# Patient Record
Sex: MALE | Race: WHITE | Employment: OTHER | URBAN - METROPOLITAN AREA
[De-identification: names, ages, dates, MRNs, and addresses within clinical notes are randomized per-mention and may not be internally consistent; named-entity substitution may affect disease eponyms.]

---

## 2017-01-24 RX ORDER — TAMSULOSIN HYDROCHLORIDE 0.4 MG/1
CAPSULE ORAL
Qty: 90 CAPSULE | Refills: 0 | OUTPATIENT
Start: 2017-01-24

## 2017-01-24 NOTE — TELEPHONE ENCOUNTER
Pt LOV 1/2/15 with Dr. James Longoria and has no upcoming reji. Refill denied pt needs to make reji.

## 2017-02-01 ENCOUNTER — TELEPHONE (OUTPATIENT)
Dept: FAMILY MEDICINE CLINIC | Facility: CLINIC | Age: 77
End: 2017-02-01

## 2017-02-01 RX ORDER — VALSARTAN 160 MG/1
160 TABLET ORAL DAILY
Qty: 30 TABLET | Refills: 0 | Status: SHIPPED | OUTPATIENT
Start: 2017-02-01 | End: 2017-02-10

## 2017-02-02 RX ORDER — VALSARTAN 160 MG/1
160 TABLET ORAL DAILY
Qty: 30 TABLET | Refills: 0 | Status: SHIPPED | OUTPATIENT
Start: 2017-02-02 | End: 2017-02-10 | Stop reason: ALTCHOICE

## 2017-02-09 ENCOUNTER — TELEPHONE (OUTPATIENT)
Dept: FAMILY MEDICINE CLINIC | Facility: CLINIC | Age: 77
End: 2017-02-09

## 2017-02-09 NOTE — TELEPHONE ENCOUNTER
Dr. Fabio Osman Hidden called again. Patient was to stop lisinopril due to continuous cough; medication was changed to Brand name Diovan at request of daughter on 2/2/17. They didn't  medication due to high cost of $250.      She is asking if Benicar

## 2017-02-09 NOTE — TELEPHONE ENCOUNTER
Patient's spouse called to speak with RN. Diovan was prescribed, but would like to discuss alternatives. Please call [phone].

## 2017-02-10 RX ORDER — VALSARTAN 160 MG/1
160 TABLET ORAL DAILY
Qty: 30 TABLET | Refills: 0 | Status: SHIPPED | OUTPATIENT
Start: 2017-02-10 | End: 2017-02-14

## 2017-02-10 NOTE — TELEPHONE ENCOUNTER
Sarah Meléndez informed of Dr Frannie Elliott recommendation and agrees. Asking that generic Diovan be sent to pharmacy then since she had requested name brand but it is too expensive. Replacement eRx sent.

## 2017-02-14 ENCOUNTER — TELEPHONE (OUTPATIENT)
Dept: FAMILY MEDICINE CLINIC | Facility: CLINIC | Age: 77
End: 2017-02-14

## 2017-02-14 RX ORDER — IRBESARTAN 150 MG/1
150 TABLET ORAL NIGHTLY
Qty: 90 TABLET | Refills: 0 | Status: SHIPPED | OUTPATIENT
Start: 2017-02-14 | End: 2017-04-05

## 2017-02-14 NOTE — TELEPHONE ENCOUNTER
Spouse states that pt is having headaches and nose bleeds this started after taking the Diovan medication. Transferred call to [facility].

## 2017-02-14 NOTE — TELEPHONE ENCOUNTER
Reason for Call/Chief Complaint: H/A and nosebleeds since beginning Diovan  Onset: Friday 2/10  Nursing Assessment/Associated Symptoms: Spoke to wife, states that lisinopril was causing cough.  Medication was changed to Diovan and since that time the pt has

## 2017-03-06 ENCOUNTER — TELEPHONE (OUTPATIENT)
Dept: FAMILY MEDICINE CLINIC | Facility: CLINIC | Age: 77
End: 2017-03-06

## 2017-03-06 RX ORDER — BUTALBITAL, ACETAMINOPHEN AND CAFFEINE 50; 325; 40 MG/1; MG/1; MG/1
1 TABLET ORAL EVERY 6 HOURS PRN
Qty: 60 TABLET | Refills: 0 | OUTPATIENT
Start: 2017-03-06 | End: 2017-10-16

## 2017-03-06 NOTE — TELEPHONE ENCOUNTER
Dr. Sarmad Downing for Dr. DALI Ibarra pt has a migraine. Daughter says that the symptoms are exactly the same as previous migraines. Denies numbness, tingling, weakness, blurred vision. She is asking for a refill for the fiorcet. Please advise.  We can call it in when appr

## 2017-03-06 NOTE — TELEPHONE ENCOUNTER
Called Butalbital-APAP-Caffeine -44 MG Oral Tab 3/6/17 script to [facility] pharmacist in [place] per Dr Beverley Horner written script.    Left detailed message for pt at his home telephone number [phone] to pickup his prescription at [facility]

## 2017-03-06 NOTE — TELEPHONE ENCOUNTER
Message noted: Chart reviewed and may refill medication as requested times one. Prescription signed. Please call into pharmacy as controlled substance.

## 2017-04-10 RX ORDER — IRBESARTAN 150 MG/1
TABLET ORAL
Qty: 30 TABLET | Refills: 0 | Status: SHIPPED | OUTPATIENT
Start: 2017-04-10 | End: 2017-06-08

## 2017-06-08 ENCOUNTER — OFFICE VISIT (OUTPATIENT)
Dept: FAMILY MEDICINE CLINIC | Facility: CLINIC | Age: 77
End: 2017-06-08

## 2017-06-08 VITALS
HEART RATE: 64 BPM | HEIGHT: 67 IN | BODY MASS INDEX: 27.65 KG/M2 | DIASTOLIC BLOOD PRESSURE: 70 MMHG | TEMPERATURE: 98 F | OXYGEN SATURATION: 95 % | SYSTOLIC BLOOD PRESSURE: 112 MMHG | RESPIRATION RATE: 16 BRPM | WEIGHT: 176.19 LBS

## 2017-06-08 DIAGNOSIS — I10 ESSENTIAL HYPERTENSION: ICD-10-CM

## 2017-06-08 DIAGNOSIS — J98.59 MEDIASTINAL MASS: ICD-10-CM

## 2017-06-08 DIAGNOSIS — Z00.00 ANNUAL PHYSICAL EXAM: Primary | ICD-10-CM

## 2017-06-08 DIAGNOSIS — N40.1 BENIGN PROSTATIC HYPERPLASIA WITH LOWER URINARY TRACT SYMPTOMS, UNSPECIFIED MORPHOLOGY: ICD-10-CM

## 2017-06-08 PROCEDURE — G0439 PPPS, SUBSEQ VISIT: HCPCS | Performed by: FAMILY MEDICINE

## 2017-06-08 RX ORDER — METOPROLOL SUCCINATE 50 MG/1
50 TABLET, EXTENDED RELEASE ORAL
Qty: 90 TABLET | Refills: 3 | Status: SHIPPED | OUTPATIENT
Start: 2017-06-08 | End: 2017-08-27

## 2017-06-08 RX ORDER — IRBESARTAN 150 MG/1
TABLET ORAL
Qty: 30 TABLET | Refills: 3 | Status: SHIPPED | OUTPATIENT
Start: 2017-06-08 | End: 2017-10-27

## 2017-06-08 RX ORDER — HYDROCHLOROTHIAZIDE 25 MG/1
25 TABLET ORAL
Qty: 90 TABLET | Refills: 3 | Status: SHIPPED | OUTPATIENT
Start: 2017-06-08 | End: 2019-02-11

## 2017-06-08 RX ORDER — TAMSULOSIN HYDROCHLORIDE 0.4 MG/1
CAPSULE ORAL
Qty: 90 CAPSULE | Refills: 3 | Status: SHIPPED | OUTPATIENT
Start: 2017-06-08 | End: 2018-07-30

## 2017-06-09 ENCOUNTER — APPOINTMENT (OUTPATIENT)
Dept: LAB | Age: 77
End: 2017-06-09
Attending: FAMILY MEDICINE
Payer: MEDICARE

## 2017-06-09 DIAGNOSIS — J98.59 MEDIASTINAL MASS: ICD-10-CM

## 2017-06-09 PROCEDURE — 85027 COMPLETE CBC AUTOMATED: CPT

## 2017-06-09 PROCEDURE — 80061 LIPID PANEL: CPT

## 2017-06-09 PROCEDURE — 80053 COMPREHEN METABOLIC PANEL: CPT

## 2017-06-09 PROCEDURE — 84443 ASSAY THYROID STIM HORMONE: CPT

## 2017-06-09 PROCEDURE — 36415 COLL VENOUS BLD VENIPUNCTURE: CPT

## 2017-06-09 PROCEDURE — 81003 URINALYSIS AUTO W/O SCOPE: CPT

## 2017-06-09 PROCEDURE — 84439 ASSAY OF FREE THYROXINE: CPT

## 2017-06-10 NOTE — PROGRESS NOTES
HPI:   Meryl Hedrick is a 77year old male who presents for a Medicare Subsequent Annual Wellness visit (Pt already had Initial Annual Wellness). Patient has a history of  hypertension.     Feels well without any adverse medication problems, and reports CBC  (most recent labs)     Lab Results  Component Value Date   WBC 8.0 06/09/2017   HGB 15.4 06/09/2017   .0 06/09/2017        ALLERGIES:   He has No Known Allergies.     CURRENT MEDICATIONS:     Outpatient Prescriptions Marked as Taking for the history  ALL/ASTHMA: denies hx of allergy or asthma    EXAM:   /70 mmHg  Pulse 64  Temp(Src) 98.2 °F (36.8 °C) (Oral)  Resp 16  Ht 67\"  Wt 176 lb 3.2 oz  BMI 27.59 kg/m2  SpO2 95%  Estimated body mass index is 27.59 kg/(m^2) as calculated from the f CHEST (CPT=71250);  Future  Benign prostatic hyperplasia with lower urinary tract symptoms, unspecified morphology  Continue Flomax  Essential hypertension  Refills given for Metoprolol and irbesartan  Low-salt diet  Exercise   to lose weight  Other orders help    Toileting: Able without help    Dressing: Able without help    Eating: Able without help    Driving: Able without help    Preparing your meals: Able without help    Managing money/bills: Able without help    Taking medications as prescribed: Able w Screening      HbgA1C   Annually No results found for: A1C    No flowsheet data found.     Fasting Blood Sugar (FSB)Annually   GLUCOSE (mg/dL)   Date Value   06/09/2017 96   06/20/2013 87   ----------  GLUCOSE (P) (mg/dL)   Date Value   02/03/2016 94   ---- SERUM (mg/dL)   Date Value   06/20/2013 0.83     CREATININE (mg/dL)   Date Value   06/09/2017 1.14     CREATININE (P) (mg/dL)   Date Value   02/03/2016 0.94    No flowsheet data found.     Drug Serum Conc  Annually No results found for: DIGOXIN, DIG, VALP N

## 2017-08-09 ENCOUNTER — OFFICE VISIT (OUTPATIENT)
Dept: FAMILY MEDICINE CLINIC | Facility: CLINIC | Age: 77
End: 2017-08-09

## 2017-08-09 VITALS
RESPIRATION RATE: 18 BRPM | BODY MASS INDEX: 26.97 KG/M2 | TEMPERATURE: 98 F | DIASTOLIC BLOOD PRESSURE: 80 MMHG | HEART RATE: 76 BPM | WEIGHT: 171.81 LBS | HEIGHT: 67 IN | SYSTOLIC BLOOD PRESSURE: 110 MMHG | OXYGEN SATURATION: 98 %

## 2017-08-09 DIAGNOSIS — R06.2 WHEEZING ON AUSCULTATION: ICD-10-CM

## 2017-08-09 DIAGNOSIS — J44.1 COPD WITH ACUTE EXACERBATION (HCC): Primary | ICD-10-CM

## 2017-08-09 PROCEDURE — 99214 OFFICE O/P EST MOD 30 MIN: CPT | Performed by: FAMILY MEDICINE

## 2017-08-09 PROCEDURE — 94640 AIRWAY INHALATION TREATMENT: CPT | Performed by: FAMILY MEDICINE

## 2017-08-09 RX ORDER — PREDNISONE 20 MG/1
20 TABLET ORAL DAILY
Qty: 5 TABLET | Refills: 0 | Status: SHIPPED | OUTPATIENT
Start: 2017-08-09 | End: 2017-08-14

## 2017-08-09 RX ORDER — FLUTICASONE PROPIONATE AND SALMETEROL 250; 50 UG/1; UG/1
1 POWDER RESPIRATORY (INHALATION) EVERY 12 HOURS SCHEDULED
Qty: 60 EACH | Refills: 6 | Status: SHIPPED | OUTPATIENT
Start: 2017-08-09 | End: 2019-02-11

## 2017-08-09 RX ORDER — IPRATROPIUM BROMIDE AND ALBUTEROL SULFATE 2.5; .5 MG/3ML; MG/3ML
3 SOLUTION RESPIRATORY (INHALATION) ONCE
Status: COMPLETED | OUTPATIENT
Start: 2017-08-09 | End: 2017-08-09

## 2017-08-09 RX ORDER — ALBUTEROL SULFATE 90 UG/1
2 AEROSOL, METERED RESPIRATORY (INHALATION) EVERY 6 HOURS PRN
Qty: 1 INHALER | Refills: 0 | Status: SHIPPED | OUTPATIENT
Start: 2017-08-09 | End: 2019-02-11

## 2017-08-09 RX ORDER — AMOXICILLIN 875 MG/1
875 TABLET, COATED ORAL 2 TIMES DAILY
Qty: 20 TABLET | Refills: 0 | Status: SHIPPED | OUTPATIENT
Start: 2017-08-09 | End: 2017-08-19

## 2017-08-09 RX ADMIN — IPRATROPIUM BROMIDE AND ALBUTEROL SULFATE 3 ML: 2.5; .5 SOLUTION RESPIRATORY (INHALATION) at 12:42:00

## 2017-08-10 NOTE — PROGRESS NOTES
/80   Pulse 76   Temp 98.1 °F (36.7 °C) (Oral)   Resp 18   Ht 67\"   Wt 171 lb 12.8 oz   SpO2 98%   BMI 26.91 kg/m²               Patient presents with:  Cough  Stuffy Nose       HPI;    Jesi Cr is a 69year old male.   Patient has history of every 6 (six) hours as needed for Pain. Disp: 60 tablet Rfl: 0   aspirin (SB LOW DOSE ASA EC) 81 MG Oral Tab EC Take 1 tablet (81 mg total) by mouth once daily.  Disp: 90 tablet Rfl: 3      Past Medical History:   Diagnosis Date   • HEADACHES    • HYPERTENS pharmacy  He will call me in 1 week if he is not feeling better      ipratropium-albuterol (DUONEB) nebulizer solution 3 mL; Take 3 mL by nebulization once. -     amoxicillin 875 MG Oral Tab; Take 1 tablet (875 mg total) by mouth 2 (two) times daily.   -

## 2017-08-29 RX ORDER — METOPROLOL SUCCINATE 50 MG/1
TABLET, EXTENDED RELEASE ORAL
Qty: 30 TABLET | Refills: 0 | Status: SHIPPED | OUTPATIENT
Start: 2017-08-29 | End: 2019-02-25

## 2017-10-03 ENCOUNTER — TELEPHONE (OUTPATIENT)
Dept: OTHER | Age: 77
End: 2017-10-03

## 2017-10-03 NOTE — TELEPHONE ENCOUNTER
----- Message from Terrance Meade RN sent at 10/3/2017  7:13 AM CDT -----      ----- Message -----  From: Sushil Son MD  Sent: 10/2/2017   6:08 PM  To: Em Harry Hoff Lpdarin/Hans    Call patient.  Needs to repeat CT scan chest as had abnormal ct scan more then a

## 2017-10-11 NOTE — TELEPHONE ENCOUNTER
Pt gave verbal permission to speak with his daughter Jeff Patricia over the phone. Reviewed Dr Fabio Rios orders 10/2/17 with her and verbalized understanding. 6/8/17 ct chest printed and mailed to home address on file.  Also informed pt and Rita to complete PHI f

## 2017-10-16 RX ORDER — BUTALBITAL, ACETAMINOPHEN AND CAFFEINE 50; 325; 40 MG/1; MG/1; MG/1
TABLET ORAL
Qty: 60 TABLET | Refills: 0 | Status: SHIPPED | OUTPATIENT
Start: 2017-10-16 | End: 2019-02-11

## 2017-10-16 NOTE — TELEPHONE ENCOUNTER
Pending Prescriptions Disp Refills    BUTALBITAL-APAP-CAFFEINE -40 MG Oral Tab [Pharmacy Med Name: BUTALBITAL/ACETAMINOPHEN/CAFF TABS] 60 tablet 0     Sig: TAKE 1 TABLET BY MOUTH EVERY 6 HOURS AS NEEDED FOR HEADACHE       Lov8/9/2017, No future reji

## 2017-10-27 DIAGNOSIS — I10 ESSENTIAL HYPERTENSION: Primary | ICD-10-CM

## 2017-10-30 RX ORDER — IRBESARTAN 150 MG/1
TABLET ORAL
Qty: 30 TABLET | Refills: 2 | Status: SHIPPED | OUTPATIENT
Start: 2017-10-30 | End: 2018-01-24

## 2017-10-30 NOTE — TELEPHONE ENCOUNTER
Refill as per protocol. LOV 8/9/2017    No future appointments.        Signed Prescriptions Disp Refills    IRBESARTAN 150 MG Oral Tab 30 tablet 2      Sig: TAKE 1 TABLET(150 MG) BY MOUTH EVERY NIGHT        Authorizing Provider: Miracle Quiroga

## 2018-01-01 NOTE — TELEPHONE ENCOUNTER
Called pt, wife answered the phone and said pt will be going out of the country tomorrow for a month and half. Pt's wife states she will call back and book an appt when pt comes back.
Hypertensive Medications Protocol Failed4/5 11:46 AM   CMP or BMP in past 12 months    Appointment in past 6 or next 3 months    Serum Creatinine < 2.0
Last refill.  May get only 1 month  Has to make an apt for furher
Noted. No further action needed.     -marilyn
rx sent. ISHA, please call pt and schedule OV. Thanks.
PMD

## 2018-01-24 DIAGNOSIS — I10 ESSENTIAL HYPERTENSION: ICD-10-CM

## 2018-01-24 RX ORDER — IRBESARTAN 150 MG/1
TABLET ORAL
Qty: 30 TABLET | Refills: 2 | Status: SHIPPED | OUTPATIENT
Start: 2018-01-24 | End: 2019-02-11

## 2018-01-24 NOTE — TELEPHONE ENCOUNTER
Signed Prescriptions Disp Refills    IRBESARTAN 150 MG Oral Tab 30 tablet 2      Sig: TAKE 1 TABLET(150 MG) BY MOUTH EVERY NIGHT        Authorizing Provider: Glorine Boeck        Ordering User: Manuel Purcell         Lov: 8/9/2017

## 2018-01-26 ENCOUNTER — TELEPHONE (OUTPATIENT)
Dept: GASTROENTEROLOGY | Facility: CLINIC | Age: 78
End: 2018-01-26

## 2018-01-26 NOTE — TELEPHONE ENCOUNTER
----- Message from Jocelyn Mendes, [address] [place] [address] sent at 1/23/2018 11:36 AM CST -----      ----- Message -----  From: Enma Loyd MD  Sent: 1/23/2018   9:48 AM  To: Cami Hoff Lpn/Hans    Please send certified letter that he needs repeat ct scan chest due abnormal

## 2018-03-09 ENCOUNTER — LAB ENCOUNTER (OUTPATIENT)
Dept: LAB | Age: 78
End: 2018-03-09
Attending: FAMILY MEDICINE
Payer: MEDICARE

## 2018-03-09 DIAGNOSIS — I10 ESSENTIAL HYPERTENSION: ICD-10-CM

## 2018-03-09 DIAGNOSIS — D29.1 BENIGN NEOPLASM OF PROSTATE: Primary | ICD-10-CM

## 2018-03-09 DIAGNOSIS — J30.9 ALLERGIC RHINITIS DUE TO ALLERGEN: ICD-10-CM

## 2018-03-09 DIAGNOSIS — Z00.00 ROUTINE GENERAL MEDICAL EXAMINATION AT A HEALTH CARE FACILITY: ICD-10-CM

## 2018-03-09 DIAGNOSIS — I10 ESSENTIAL HYPERTENSION, MALIGNANT: ICD-10-CM

## 2018-03-09 LAB
25-HYDROXYVITAMIN D (TOTAL): 20.5 NG/ML (ref 30–100)
ALBUMIN SERPL-MCNC: 3.5 G/DL (ref 3.5–4.8)
ALP LIVER SERPL-CCNC: 76 U/L (ref 45–117)
ALT SERPL-CCNC: 22 U/L (ref 17–63)
AST SERPL-CCNC: 17 U/L (ref 15–41)
BASOPHILS # BLD AUTO: 0.11 X10(3) UL (ref 0–0.1)
BASOPHILS NFR BLD AUTO: 1.5 %
BILIRUB SERPL-MCNC: 0.8 MG/DL (ref 0.1–2)
BILIRUB UR QL STRIP.AUTO: NEGATIVE
BUN BLD-MCNC: 24 MG/DL (ref 8–20)
CALCIUM BLD-MCNC: 9.3 MG/DL (ref 8.3–10.3)
CHLORIDE: 106 MMOL/L (ref 101–111)
CHOLEST SMN-MCNC: 217 MG/DL (ref ?–200)
CLARITY UR REFRACT.AUTO: CLEAR
CO2: 29 MMOL/L (ref 22–32)
COLOR UR AUTO: YELLOW
CREAT BLD-MCNC: 0.85 MG/DL (ref 0.7–1.3)
EOSINOPHIL # BLD AUTO: 0.62 X10(3) UL (ref 0–0.3)
EOSINOPHIL NFR BLD AUTO: 8.3 %
ERYTHROCYTE [DISTWIDTH] IN BLOOD BY AUTOMATED COUNT: 13 % (ref 11.5–16)
GLUCOSE BLD-MCNC: 93 MG/DL (ref 70–99)
GLUCOSE UR STRIP.AUTO-MCNC: NEGATIVE MG/DL
HAV IGM SER QL: NONREACTIVE
HBV CORE IGM SER QL: NONREACTIVE
HBV SURFACE AG SERPL QL IA: NONREACTIVE
HCT VFR BLD AUTO: 43.7 % (ref 37–53)
HDLC SERPL-MCNC: 43 MG/DL (ref 45–?)
HDLC SERPL: 5.05 {RATIO} (ref ?–4.97)
HEPATITIS C VIRUS AB INTERPRETATION: NONREACTIVE
HGB BLD-MCNC: 15 G/DL (ref 13–17)
IMMATURE GRANULOCYTE COUNT: 0.01 X10(3) UL (ref 0–1)
IMMATURE GRANULOCYTE RATIO %: 0.1 %
KETONES UR STRIP.AUTO-MCNC: NEGATIVE MG/DL
LDLC SERPL CALC-MCNC: 145 MG/DL (ref ?–130)
LEUKOCYTE ESTERASE UR QL STRIP.AUTO: NEGATIVE
LYMPHOCYTES # BLD AUTO: 2.24 X10(3) UL (ref 0.9–4)
LYMPHOCYTES NFR BLD AUTO: 30 %
M PROTEIN MFR SERPL ELPH: 7.1 G/DL (ref 6.1–8.3)
MCH RBC QN AUTO: 29.6 PG (ref 27–33.2)
MCHC RBC AUTO-ENTMCNC: 34.3 G/DL (ref 31–37)
MCV RBC AUTO: 86.4 FL (ref 80–99)
MONOCYTES # BLD AUTO: 0.83 X10(3) UL (ref 0.1–1)
MONOCYTES NFR BLD AUTO: 11.1 %
NEUTROPHIL ABS PRELIM: 3.65 X10 (3) UL (ref 1.3–6.7)
NEUTROPHILS # BLD AUTO: 3.65 X10(3) UL (ref 1.3–6.7)
NEUTROPHILS NFR BLD AUTO: 49 %
NITRITE UR QL STRIP.AUTO: NEGATIVE
NONHDLC SERPL-MCNC: 174 MG/DL (ref ?–130)
PH UR STRIP.AUTO: 6 [PH] (ref 4.5–8)
PLATELET # BLD AUTO: 259 10(3)UL (ref 150–450)
POTASSIUM SERPL-SCNC: 3.7 MMOL/L (ref 3.6–5.1)
PROT UR STRIP.AUTO-MCNC: NEGATIVE MG/DL
PSA SERPL-MCNC: 1.63 NG/ML (ref 0.01–4)
PTH-INTACT SERPL-MCNC: 73.2 PG/ML (ref 11.1–79.5)
RBC # BLD AUTO: 5.06 X10(6)UL (ref 3.8–5.8)
RBC UR QL AUTO: NEGATIVE
RED CELL DISTRIBUTION WIDTH-SD: 40.1 FL (ref 35.1–46.3)
SODIUM SERPL-SCNC: 142 MMOL/L (ref 136–144)
SP GR UR STRIP.AUTO: 1.02 (ref 1–1.03)
TRIGL SERPL-MCNC: 146 MG/DL (ref ?–150)
TSI SER-ACNC: 0.1 MIU/ML (ref 0.35–5.5)
UROBILINOGEN UR STRIP.AUTO-MCNC: <2 MG/DL
VLDLC SERPL CALC-MCNC: 29 MG/DL (ref 5–40)
WBC # BLD AUTO: 7.5 X10(3) UL (ref 4–13)

## 2018-03-09 PROCEDURE — 80053 COMPREHEN METABOLIC PANEL: CPT

## 2018-03-09 PROCEDURE — 83970 ASSAY OF PARATHORMONE: CPT

## 2018-03-09 PROCEDURE — 84153 ASSAY OF PSA TOTAL: CPT

## 2018-03-09 PROCEDURE — 80061 LIPID PANEL: CPT

## 2018-03-09 PROCEDURE — 36415 COLL VENOUS BLD VENIPUNCTURE: CPT

## 2018-03-09 PROCEDURE — 81003 URINALYSIS AUTO W/O SCOPE: CPT

## 2018-03-09 PROCEDURE — 82306 VITAMIN D 25 HYDROXY: CPT

## 2018-03-09 PROCEDURE — 80074 ACUTE HEPATITIS PANEL: CPT

## 2018-03-09 PROCEDURE — 85025 COMPLETE CBC W/AUTO DIFF WBC: CPT

## 2018-03-09 PROCEDURE — 84443 ASSAY THYROID STIM HORMONE: CPT

## 2018-05-04 DIAGNOSIS — I10 ESSENTIAL HYPERTENSION: ICD-10-CM

## 2018-05-04 RX ORDER — IRBESARTAN 150 MG/1
TABLET ORAL
Qty: 30 TABLET | Refills: 0 | OUTPATIENT
Start: 2018-05-04

## 2018-05-04 NOTE — TELEPHONE ENCOUNTER
Spoke with daughter, Meli Aguero. Pt has a new PCP. He is no longer under the care of Dr Tj Haley.      Denied refill request for Irbesartan

## 2018-07-20 RX ORDER — HYDROCHLOROTHIAZIDE 25 MG/1
TABLET ORAL
Qty: 90 TABLET | Refills: 0 | OUTPATIENT
Start: 2018-07-20

## 2018-07-20 NOTE — TELEPHONE ENCOUNTER
LOV 8/9/17    LAST LAB 3/9/18    LAST RX 6/8/17  #90  3 refills    Next OV  No future appointments.     PROTOCOL  HYDROCHLOROTHIAZIDE 25MG TABLETS  Will file in chart as: HYDROCHLOROTHIAZIDE 25 MG Oral Tab  TAKE 1 TABLET(25 MG) BY MOUTH EVERY DAY       Disp

## 2018-08-01 RX ORDER — METOPROLOL SUCCINATE 50 MG/1
TABLET, EXTENDED RELEASE ORAL
Qty: 90 TABLET | Refills: 0 | Status: SHIPPED | OUTPATIENT
Start: 2018-08-01 | End: 2019-02-11

## 2018-08-01 RX ORDER — TAMSULOSIN HYDROCHLORIDE 0.4 MG/1
CAPSULE ORAL
Qty: 90 CAPSULE | Refills: 0 | Status: SHIPPED | OUTPATIENT
Start: 2018-08-01 | End: 2019-02-11

## 2018-08-01 NOTE — TELEPHONE ENCOUNTER
LOV 8/9/17- COPD    LAST LAB 3/9/18- Ordered by Dr. Krysta Urbina, Family Medicine, [facility] 6/8/17  #90  3 refills    Next OV No future appointments.     PROTOCOL  METOPROLOL ER SUCCINATE 50MG TABS  The source prescription was discontinued on

## 2019-02-11 ENCOUNTER — OFFICE VISIT (OUTPATIENT)
Dept: FAMILY MEDICINE CLINIC | Facility: CLINIC | Age: 79
End: 2019-02-11
Payer: MEDICARE

## 2019-02-11 VITALS
HEART RATE: 68 BPM | TEMPERATURE: 99 F | RESPIRATION RATE: 16 BRPM | DIASTOLIC BLOOD PRESSURE: 74 MMHG | OXYGEN SATURATION: 95 % | WEIGHT: 174.38 LBS | SYSTOLIC BLOOD PRESSURE: 130 MMHG | BODY MASS INDEX: 26.74 KG/M2 | HEIGHT: 67.72 IN

## 2019-02-11 DIAGNOSIS — J44.1 COPD WITH ACUTE EXACERBATION (HCC): ICD-10-CM

## 2019-02-11 DIAGNOSIS — Z13.6 SCREENING FOR CARDIOVASCULAR CONDITION: ICD-10-CM

## 2019-02-11 DIAGNOSIS — Z00.00 ENCOUNTER FOR ANNUAL HEALTH EXAMINATION: Primary | ICD-10-CM

## 2019-02-11 DIAGNOSIS — I10 ESSENTIAL HYPERTENSION: ICD-10-CM

## 2019-02-11 PROCEDURE — G0439 PPPS, SUBSEQ VISIT: HCPCS | Performed by: FAMILY MEDICINE

## 2019-02-11 PROCEDURE — 99497 ADVNCD CARE PLAN 30 MIN: CPT | Performed by: FAMILY MEDICINE

## 2019-02-11 RX ORDER — HYDROCHLOROTHIAZIDE 25 MG/1
25 TABLET ORAL
Qty: 90 TABLET | Refills: 3 | Status: SHIPPED | OUTPATIENT
Start: 2019-02-11 | End: 2020-02-07

## 2019-02-11 RX ORDER — TAMSULOSIN HYDROCHLORIDE 0.4 MG/1
CAPSULE ORAL
Qty: 90 CAPSULE | Refills: 3 | Status: SHIPPED | OUTPATIENT
Start: 2019-02-11 | End: 2020-02-07

## 2019-02-11 RX ORDER — METOPROLOL SUCCINATE 50 MG/1
TABLET, EXTENDED RELEASE ORAL
Qty: 90 TABLET | Refills: 1 | Status: SHIPPED | OUTPATIENT
Start: 2019-02-11 | End: 2019-08-08

## 2019-02-11 RX ORDER — IRBESARTAN 150 MG/1
TABLET ORAL
Qty: 90 TABLET | Refills: 2 | Status: SHIPPED | OUTPATIENT
Start: 2019-02-11 | End: 2019-11-06

## 2019-02-11 RX ORDER — ASPIRIN 81 MG/1
81 TABLET ORAL
Qty: 90 TABLET | Refills: 3 | Status: SHIPPED | OUTPATIENT
Start: 2019-02-11 | End: 2020-02-24

## 2019-02-11 RX ORDER — FLUTICASONE PROPIONATE AND SALMETEROL 250; 50 UG/1; UG/1
1 POWDER RESPIRATORY (INHALATION) EVERY 12 HOURS SCHEDULED
Qty: 60 EACH | Refills: 6 | Status: SHIPPED | OUTPATIENT
Start: 2019-02-11 | End: 2019-02-25

## 2019-02-11 RX ORDER — ALBUTEROL SULFATE 90 UG/1
2 AEROSOL, METERED RESPIRATORY (INHALATION) EVERY 6 HOURS PRN
Qty: 1 INHALER | Refills: 0 | Status: SHIPPED | OUTPATIENT
Start: 2019-02-11 | End: 2019-04-03

## 2019-02-11 RX ORDER — BUTALBITAL, ACETAMINOPHEN AND CAFFEINE 50; 325; 40 MG/1; MG/1; MG/1
TABLET ORAL
Qty: 60 TABLET | Refills: 0 | Status: SHIPPED | OUTPATIENT
Start: 2019-02-11 | End: 2019-05-31

## 2019-02-11 NOTE — PROGRESS NOTES
HPI:   Brian Garcia is a 69year old male who presents for a Medicare Subsequent Annual Wellness visit (Pt already had Initial Annual Wellness).            Fall/Risk Assessment   He has been screened for Falls and is low risk: Fall/Risk Scorin    C was screened for Alcohol abuse and had a score of 0 so is at low risk.      Patient Care Team: No care team member to display    Patient Active Problem List:     Migraine without aura, without mention of intractable migraine without mention of status migrai tablet (25 mg total) by mouth once daily. aspirin (SB LOW DOSE ASA EC) 81 MG Oral Tab EC Take 1 tablet (81 mg total) by mouth once daily.    METOPROLOL SUCCINATE ER 50 MG Oral Tablet 24 Hr TAKE 1 TABLET BY MOUTH EVERY DAY      MEDICAL INFORMATION:   He  h no discharge or no axillary lymphadenopathy   LUNGS: clear to auscultation  CARDIO: RRR without murmur  GI: good BS's, no masses, HSM or tenderness  : deferred  RECTAL: deferred  MUSCULOSKELETAL: back is not tender, FROM of the back  EXTREMITIES: no cyan PLAN:  The patient indicates understanding of these issues and agrees to the plan. Reinforced healthy diet, lifestyle, and exercise. Recommended OTC medications- Vitamin D  Prescription medication ordered. Imaging studies ordered. Lab work ordered. FHx Glaucoma, AA>50, > 65 No flowsheet data found. Prostate Cancer Screening      PSA  Annually There are no preventive care reminders to display for this patient.   Update Health Maintenance if applicable     Immunizations (Update Immunization A

## 2019-02-11 NOTE — PATIENT INSTRUCTIONS
Caitlin Claudio's SCREENING SCHEDULE   Tests on this list are recommended by your physician but may not be covered, or covered at this frequency, by your insurer. Please check with your insurance carrier before scheduling to verify coverage.     JOHN Cancer Screening Covered up to Age 76     Colonoscopy Screen   Covered every 10 years- more often if abnormal There are no preventive care reminders to display for this patient.  Update Home[employer] if applicable    Flex Sigmoidoscopy Screen  Covered with a cut with metal- TD and TDaP Not covered by Medicare Part B) No orders found for this or any previous visit.  This may be covered with your prescription benefits, but Medicare does not cover unless Medically needed    Zoster (Not covered by Medicare P

## 2019-02-15 ENCOUNTER — LAB ENCOUNTER (OUTPATIENT)
Dept: LAB | Age: 79
End: 2019-02-15
Attending: FAMILY MEDICINE
Payer: MEDICARE

## 2019-02-15 ENCOUNTER — TELEPHONE (OUTPATIENT)
Dept: FAMILY MEDICINE CLINIC | Facility: CLINIC | Age: 79
End: 2019-02-15

## 2019-02-15 DIAGNOSIS — Z00.00 ROUTINE GENERAL MEDICAL EXAMINATION AT A HEALTH CARE FACILITY: Primary | ICD-10-CM

## 2019-02-15 DIAGNOSIS — R79.89 LOW TSH LEVEL: ICD-10-CM

## 2019-02-15 DIAGNOSIS — Z00.00 ENCOUNTER FOR ANNUAL HEALTH EXAMINATION: ICD-10-CM

## 2019-02-15 DIAGNOSIS — Z13.6 SCREENING FOR CARDIOVASCULAR CONDITION: ICD-10-CM

## 2019-02-15 DIAGNOSIS — Z00.00 ROUTINE GENERAL MEDICAL EXAMINATION AT A HEALTH CARE FACILITY: ICD-10-CM

## 2019-02-15 LAB
ALBUMIN SERPL-MCNC: 3.6 G/DL (ref 3.4–5)
ALBUMIN/GLOB SERPL: 1.1 {RATIO} (ref 1–2)
ALP LIVER SERPL-CCNC: 69 U/L (ref 45–117)
ALT SERPL-CCNC: 24 U/L (ref 16–61)
ANION GAP SERPL CALC-SCNC: 7 MMOL/L (ref 0–18)
AST SERPL-CCNC: 14 U/L (ref 15–37)
BASOPHILS # BLD AUTO: 0.11 X10(3) UL (ref 0–0.2)
BASOPHILS NFR BLD AUTO: 1.5 %
BILIRUB SERPL-MCNC: 0.7 MG/DL (ref 0.1–2)
BILIRUB UR QL STRIP.AUTO: NEGATIVE
BUN BLD-MCNC: 28 MG/DL (ref 7–18)
BUN/CREAT SERPL: 28.6 (ref 10–20)
CALCIUM BLD-MCNC: 8.7 MG/DL (ref 8.5–10.1)
CHLORIDE SERPL-SCNC: 102 MMOL/L (ref 98–107)
CHOLEST SMN-MCNC: 249 MG/DL (ref ?–200)
CLARITY UR REFRACT.AUTO: CLEAR
CO2 SERPL-SCNC: 28 MMOL/L (ref 21–32)
COLOR UR AUTO: YELLOW
COMPLEXED PSA SERPL-MCNC: 1.49 NG/ML (ref ?–4)
CREAT BLD-MCNC: 0.98 MG/DL (ref 0.7–1.3)
DEPRECATED RDW RBC AUTO: 39.9 FL (ref 35.1–46.3)
EOSINOPHIL # BLD AUTO: 0.53 X10(3) UL (ref 0–0.7)
EOSINOPHIL NFR BLD AUTO: 7.1 %
ERYTHROCYTE [DISTWIDTH] IN BLOOD BY AUTOMATED COUNT: 12.7 % (ref 11–15)
GLOBULIN PLAS-MCNC: 3.4 G/DL (ref 2.8–4.4)
GLUCOSE BLD-MCNC: 94 MG/DL (ref 70–99)
GLUCOSE UR STRIP.AUTO-MCNC: NEGATIVE MG/DL
HCT VFR BLD AUTO: 43.7 % (ref 39–53)
HDLC SERPL-MCNC: 49 MG/DL (ref 40–59)
HGB BLD-MCNC: 15.5 G/DL (ref 13–17.5)
IMM GRANULOCYTES # BLD AUTO: 0.02 X10(3) UL (ref 0–1)
IMM GRANULOCYTES NFR BLD: 0.3 %
KETONES UR STRIP.AUTO-MCNC: NEGATIVE MG/DL
LDLC SERPL CALC-MCNC: 175 MG/DL (ref ?–100)
LEUKOCYTE ESTERASE UR QL STRIP.AUTO: NEGATIVE
LYMPHOCYTES # BLD AUTO: 2.55 X10(3) UL (ref 1–4)
LYMPHOCYTES NFR BLD AUTO: 34 %
M PROTEIN MFR SERPL ELPH: 7 G/DL (ref 6.4–8.2)
MCH RBC QN AUTO: 30.7 PG (ref 26–34)
MCHC RBC AUTO-ENTMCNC: 35.5 G/DL (ref 31–37)
MCV RBC AUTO: 86.5 FL (ref 80–100)
MONOCYTES # BLD AUTO: 0.94 X10(3) UL (ref 0.1–1)
MONOCYTES NFR BLD AUTO: 12.6 %
NEUTROPHILS # BLD AUTO: 3.34 X10 (3) UL (ref 1.5–7.7)
NEUTROPHILS # BLD AUTO: 3.34 X10(3) UL (ref 1.5–7.7)
NEUTROPHILS NFR BLD AUTO: 44.5 %
NITRITE UR QL STRIP.AUTO: NEGATIVE
NONHDLC SERPL-MCNC: 200 MG/DL (ref ?–130)
OSMOLALITY SERPL CALC.SUM OF ELEC: 289 MOSM/KG (ref 275–295)
PH UR STRIP.AUTO: 6 [PH] (ref 4.5–8)
PLATELET # BLD AUTO: 234 10(3)UL (ref 150–450)
POTASSIUM SERPL-SCNC: 3.6 MMOL/L (ref 3.5–5.1)
PROT UR STRIP.AUTO-MCNC: NEGATIVE MG/DL
RBC # BLD AUTO: 5.05 X10(6)UL (ref 3.8–5.8)
RBC UR QL AUTO: NEGATIVE
SODIUM SERPL-SCNC: 137 MMOL/L (ref 136–145)
SP GR UR STRIP.AUTO: 1.02 (ref 1–1.03)
T3 SERPL-MCNC: 104 NG/DL (ref 60–181)
T4 FREE SERPL-MCNC: 1.2 NG/DL (ref 0.8–1.7)
T4 SERPL-MCNC: 11.6 UG/DL (ref 4.5–12.1)
TRIGL SERPL-MCNC: 125 MG/DL (ref 30–149)
TSI SER-ACNC: 0.15 MIU/ML (ref 0.36–3.74)
UROBILINOGEN UR STRIP.AUTO-MCNC: <2 MG/DL
VLDLC SERPL CALC-MCNC: 25 MG/DL (ref 0–30)
WBC # BLD AUTO: 7.5 X10(3) UL (ref 4–11)

## 2019-02-15 PROCEDURE — 84443 ASSAY THYROID STIM HORMONE: CPT

## 2019-02-15 PROCEDURE — 81003 URINALYSIS AUTO W/O SCOPE: CPT

## 2019-02-15 PROCEDURE — 85025 COMPLETE CBC W/AUTO DIFF WBC: CPT

## 2019-02-15 PROCEDURE — 84439 ASSAY OF FREE THYROXINE: CPT

## 2019-02-15 PROCEDURE — 80053 COMPREHEN METABOLIC PANEL: CPT

## 2019-02-15 PROCEDURE — 84480 ASSAY TRIIODOTHYRONINE (T3): CPT

## 2019-02-15 PROCEDURE — 80061 LIPID PANEL: CPT

## 2019-02-16 NOTE — PROGRESS NOTES
All the blood work is within acceptable range   except chronically low TSH, but with normal T3 and T4--ultrasound of the thyroid, will be ordered at the time of follow-up  Elevated cholesterol, LDL.   Patient needs to be on cholesterol-lowering medication

## 2019-02-18 NOTE — PROGRESS NOTES
Spoke to patient/daughter  informed of results and information given.     Future Appointments  2/25/2019  1:00 PM    Yuliana Kapoor MD           [facility]

## 2019-02-25 ENCOUNTER — OFFICE VISIT (OUTPATIENT)
Dept: FAMILY MEDICINE CLINIC | Facility: CLINIC | Age: 79
End: 2019-02-25
Payer: MEDICARE

## 2019-02-25 VITALS
DIASTOLIC BLOOD PRESSURE: 70 MMHG | SYSTOLIC BLOOD PRESSURE: 124 MMHG | HEART RATE: 81 BPM | OXYGEN SATURATION: 98 % | WEIGHT: 174.38 LBS | HEIGHT: 67.72 IN | TEMPERATURE: 97 F | RESPIRATION RATE: 16 BRPM | BODY MASS INDEX: 26.74 KG/M2

## 2019-02-25 DIAGNOSIS — J44.9 CHRONIC OBSTRUCTIVE PULMONARY DISEASE, UNSPECIFIED COPD TYPE (HCC): ICD-10-CM

## 2019-02-25 DIAGNOSIS — E78.2 MIXED HYPERLIPIDEMIA: ICD-10-CM

## 2019-02-25 DIAGNOSIS — R79.89 LOW TSH LEVEL: ICD-10-CM

## 2019-02-25 DIAGNOSIS — Z86.39 HX OF THYROID NODULE: Primary | ICD-10-CM

## 2019-02-25 PROCEDURE — 99214 OFFICE O/P EST MOD 30 MIN: CPT | Performed by: FAMILY MEDICINE

## 2019-02-25 RX ORDER — ROSUVASTATIN CALCIUM 5 MG/1
5 TABLET, COATED ORAL NIGHTLY
Qty: 30 TABLET | Refills: 0 | Status: SHIPPED | OUTPATIENT
Start: 2019-02-25 | End: 2019-11-14

## 2019-02-25 NOTE — PROGRESS NOTES
/70   Pulse 81   Temp 97.3 °F (36.3 °C) (Oral)   Resp 16   Ht 67.72\"   Wt 174 lb 6 oz   SpO2 98%   BMI 26.73 kg/m²               Patient presents with:  Lab Results: patient refused phneumovax vaccine        HPI;    Ta Ramsey is a 69year old lungs every 6 (six) hours as needed for Wheezing. Disp: 1 Inhaler Rfl: 0   hydrochlorothiazide 25 MG Oral Tab Take 1 tablet (25 mg total) by mouth once daily.  Disp: 90 tablet Rfl: 3   aspirin (SB LOW DOSE ASA EC) 81 MG Oral Tab EC Take 1 tablet (81 mg tota sending a prescription for Flovent 220  Patient will call me if the insurance does not cover this medication      Fluticasone Propionate HFA (FLOVENT HFA) 220 MCG/ACT Inhalation Aerosol; Inhale 1 puff into the lungs 2 (two) times daily.     Mixed hyperlipid

## 2019-02-27 ENCOUNTER — TELEPHONE (OUTPATIENT)
Dept: FAMILY MEDICINE CLINIC | Facility: CLINIC | Age: 79
End: 2019-02-27

## 2019-02-27 DIAGNOSIS — E78.00 HYPERCHOLESTEREMIA: Primary | ICD-10-CM

## 2019-02-27 NOTE — TELEPHONE ENCOUNTER
LOV 2/25/19 Recheck lipid profile in 2 months. Lab ordered. Pharmacy wanted 90 days advised that this is a new Rx and patient will need to f/u.

## 2019-04-03 ENCOUNTER — OFFICE VISIT (OUTPATIENT)
Dept: FAMILY MEDICINE CLINIC | Facility: CLINIC | Age: 79
End: 2019-04-03
Payer: MEDICARE

## 2019-04-03 VITALS
SYSTOLIC BLOOD PRESSURE: 122 MMHG | RESPIRATION RATE: 18 BRPM | BODY MASS INDEX: 25.91 KG/M2 | HEART RATE: 71 BPM | TEMPERATURE: 98 F | HEIGHT: 67.2 IN | WEIGHT: 167 LBS | OXYGEN SATURATION: 98 % | DIASTOLIC BLOOD PRESSURE: 78 MMHG

## 2019-04-03 DIAGNOSIS — J44.1 COPD WITH ACUTE EXACERBATION (HCC): ICD-10-CM

## 2019-04-03 PROCEDURE — 99213 OFFICE O/P EST LOW 20 MIN: CPT | Performed by: FAMILY MEDICINE

## 2019-04-03 RX ORDER — ALBUTEROL SULFATE 90 UG/1
2 AEROSOL, METERED RESPIRATORY (INHALATION) EVERY 6 HOURS PRN
Qty: 1 INHALER | Refills: 0 | Status: SHIPPED | OUTPATIENT
Start: 2019-04-03 | End: 2019-05-01 | Stop reason: ALTCHOICE

## 2019-04-03 RX ORDER — ALBUTEROL SULFATE 90 UG/1
AEROSOL, METERED RESPIRATORY (INHALATION)
Qty: 25.5 G | Refills: 0 | OUTPATIENT
Start: 2019-04-03

## 2019-04-03 RX ORDER — AZITHROMYCIN 250 MG/1
TABLET, FILM COATED ORAL
Qty: 6 TABLET | Refills: 0 | Status: SHIPPED | OUTPATIENT
Start: 2019-04-03 | End: 2019-05-01 | Stop reason: ALTCHOICE

## 2019-04-03 NOTE — TELEPHONE ENCOUNTER
Name from pharmacy: ALBUTEROL HFA INH (200 PUFFS) 8.5GM          Will file in chart as: ALBUTEROL SULFATE  (90 Base) MCG/ACT Inhalation Aero Soln    Sig: INHALE 2 PUFFS INTO THE LUNGS EVERY 6 HOURS AS NEEDED FOR WHEEZING    Disp:  25.5 g    Refills:

## 2019-04-03 NOTE — PROGRESS NOTES
/78   Pulse 71   Temp 98.2 °F (36.8 °C) (Oral)   Resp 18   Ht 67.2\"   Wt 167 lb   SpO2 98%   BMI 26.00 kg/m²               Patient presents with:  Cough       HPI;    Brittny Colbert is a 69year old male.   Speaks Jamaican   History of COPD comes in tablet (25 mg total) by mouth once daily. Disp: 90 tablet Rfl: 3   aspirin (SB LOW DOSE ASA EC) 81 MG Oral Tab EC Take 1 tablet (81 mg total) by mouth once daily.  Disp: 90 tablet Rfl: 3      Past Medical History:   Diagnosis Date   • HEADACHES    • HYPERTE HFA) 220 MCG/ACT Inhalation Aerosol; Inhale 1 puff into the lungs 2 (two) times daily. -     Albuterol Sulfate  (90 Base) MCG/ACT Inhalation Aero Soln; Inhale 2 puffs into the lungs every 6 (six) hours as needed for Wheezing.     Other orders  -

## 2019-05-01 ENCOUNTER — LAB ENCOUNTER (OUTPATIENT)
Dept: LAB | Age: 79
End: 2019-05-01
Attending: FAMILY MEDICINE
Payer: MEDICARE

## 2019-05-01 ENCOUNTER — OFFICE VISIT (OUTPATIENT)
Dept: FAMILY MEDICINE CLINIC | Facility: CLINIC | Age: 79
End: 2019-05-01
Payer: MEDICARE

## 2019-05-01 VITALS
RESPIRATION RATE: 18 BRPM | HEIGHT: 67.2 IN | WEIGHT: 164 LBS | HEART RATE: 64 BPM | TEMPERATURE: 98 F | BODY MASS INDEX: 25.44 KG/M2 | OXYGEN SATURATION: 98 % | DIASTOLIC BLOOD PRESSURE: 68 MMHG | SYSTOLIC BLOOD PRESSURE: 120 MMHG

## 2019-05-01 DIAGNOSIS — J44.9 CHRONIC OBSTRUCTIVE PULMONARY DISEASE, UNSPECIFIED COPD TYPE (HCC): ICD-10-CM

## 2019-05-01 DIAGNOSIS — J44.9 CHRONIC OBSTRUCTIVE PULMONARY DISEASE, UNSPECIFIED COPD TYPE (HCC): Primary | ICD-10-CM

## 2019-05-01 DIAGNOSIS — E78.00 HYPERCHOLESTEREMIA: ICD-10-CM

## 2019-05-01 PROCEDURE — 85025 COMPLETE CBC W/AUTO DIFF WBC: CPT

## 2019-05-01 PROCEDURE — 99213 OFFICE O/P EST LOW 20 MIN: CPT | Performed by: FAMILY MEDICINE

## 2019-05-01 PROCEDURE — 80061 LIPID PANEL: CPT

## 2019-05-01 PROCEDURE — 80053 COMPREHEN METABOLIC PANEL: CPT

## 2019-05-02 NOTE — PROGRESS NOTES
/68   Pulse 64   Temp 98 °F (36.7 °C) (Oral)   Resp 18   Ht 67.2\"   Wt 164 lb   SpO2 98%   BMI 25.53 kg/m²               Patient presents with:  Viral Syndrome: f/u       HPI;    Calvin Angeles is a 69year old male.  Patient with a history of COPD (SB LOW DOSE ASA EC) 81 MG Oral Tab EC Take 1 tablet (81 mg total) by mouth once daily. Disp: 90 tablet Rfl: 3   Fluticasone Propionate HFA (FLOVENT HFA) 220 MCG/ACT Inhalation Aerosol Inhale 1 puff into the lungs 2 (two) times daily.  Disp: 1 Inhaler Rfl: steroid  Unfortunately they have to buy it      The patient indicates understanding of these issues and agrees to the plan.   Imaging & Consults:  None  Meds & Refills for this Visit:  Requested Prescriptions      No prescriptions requested or ordered in th

## 2019-05-22 RX ORDER — BUTALBITAL, ACETAMINOPHEN AND CAFFEINE 300; 40; 50 MG/1; MG/1; MG/1
CAPSULE ORAL
Qty: 30 CAPSULE | Refills: 0 | Status: SHIPPED | OUTPATIENT
Start: 2019-05-22 | End: 2019-06-05

## 2019-05-22 NOTE — TELEPHONE ENCOUNTER
Name from pharmacy: BUT/ACETAMINOPHEN CAFF -40 CP          Will file in chart as: BUTALBITAL-APAP-CAFFEINE -40 MG Oral Cap    Sig: TAKE ONE CAPSULE BY MOUTH EVERY 6 HOURS AS NEEDED    Disp:  30 capsule    Refills:  0    Start: 5/22/2019    Stefania

## 2019-05-31 ENCOUNTER — TELEPHONE (OUTPATIENT)
Dept: FAMILY MEDICINE CLINIC | Facility: CLINIC | Age: 79
End: 2019-05-31

## 2019-05-31 DIAGNOSIS — G43.709 CHRONIC MIGRAINE WITHOUT AURA WITHOUT STATUS MIGRAINOSUS, NOT INTRACTABLE: Primary | ICD-10-CM

## 2019-05-31 NOTE — TELEPHONE ENCOUNTER
BUTALBITAL-APAP-CAFFEINE -40 MG Oral Cap 30 capsule 0 5/22/2019    Sig: Piter Childress 6 HOURS AS NEEDED       Dr Clarita Glez there are no notes as to why this patient needs this Rx. This needs to be in the chart.  As I may have to send

## 2019-06-05 RX ORDER — BUTALBITAL, ACETAMINOPHEN AND CAFFEINE 300; 40; 50 MG/1; MG/1; MG/1
CAPSULE ORAL
Qty: 30 CAPSULE | Refills: 0 | Status: SHIPPED | OUTPATIENT
Start: 2019-06-05 | End: 2020-02-24

## 2019-06-05 NOTE — TELEPHONE ENCOUNTER
Insurance needs this to do prior [facility]. Please document  the indication for the continued use of the HRM (high risk med) with an explanation of the specific benefit established with the medication and how that benefit outweighs the potential risk?

## 2019-06-06 ENCOUNTER — OFFICE VISIT (OUTPATIENT)
Dept: FAMILY MEDICINE CLINIC | Facility: CLINIC | Age: 79
End: 2019-06-06
Payer: MEDICARE

## 2019-06-06 VITALS
TEMPERATURE: 98 F | OXYGEN SATURATION: 98 % | HEIGHT: 67.2 IN | BODY MASS INDEX: 25.29 KG/M2 | DIASTOLIC BLOOD PRESSURE: 74 MMHG | HEART RATE: 71 BPM | RESPIRATION RATE: 18 BRPM | SYSTOLIC BLOOD PRESSURE: 110 MMHG | WEIGHT: 163 LBS

## 2019-06-06 DIAGNOSIS — J30.9 ALLERGIC RHINITIS, UNSPECIFIED SEASONALITY, UNSPECIFIED TRIGGER: ICD-10-CM

## 2019-06-06 DIAGNOSIS — G43.009 MIGRAINE WITHOUT AURA AND WITHOUT STATUS MIGRAINOSUS, NOT INTRACTABLE: Primary | ICD-10-CM

## 2019-06-06 PROCEDURE — 99213 OFFICE O/P EST LOW 20 MIN: CPT | Performed by: FAMILY MEDICINE

## 2019-06-06 RX ORDER — LORATADINE 10 MG/1
10 TABLET ORAL DAILY
Qty: 30 TABLET | Refills: 0 | Status: SHIPPED | OUTPATIENT
Start: 2019-06-06

## 2019-06-06 RX ORDER — FLUTICASONE PROPIONATE 50 MCG
2 SPRAY, SUSPENSION (ML) NASAL DAILY
Qty: 1 BOTTLE | Refills: 3 | Status: SHIPPED | OUTPATIENT
Start: 2019-06-06

## 2019-06-06 NOTE — PROGRESS NOTES
/74   Pulse 71   Temp 97.9 °F (36.6 °C) (Oral)   Resp 18   Ht 67.2\"   Wt 163 lb   SpO2 98%   BMI 25.38 kg/m²               Patient presents with:  Migraine       HPI;    Alyssa Juarez is a 69year old male.   With history of recurrent migraines, pa total) by mouth once daily. Disp: 90 tablet Rfl: 3   aspirin (SB LOW DOSE ASA EC) 81 MG Oral Tab EC Take 1 tablet (81 mg total) by mouth once daily.  Disp: 90 tablet Rfl: 3      Past Medical History:   Diagnosis Date   • HEADACHES    • HYPERTENSION    • Jayro Claritin and Flonase    loratadine (CLARITIN) 10 MG Oral Tab; Take 1 tablet (10 mg total) by mouth daily.  -     Fluticasone Propionate 50 MCG/ACT Nasal Suspension; 2 sprays by Each Nare route daily.         The patient indicates understanding of these issu

## 2019-06-06 NOTE — TELEPHONE ENCOUNTER
Future Appointments   Date Time Provider Phoebe Tang   6/6/2019  2:45 PM Giovanni Galan MD [facility]

## 2019-08-08 RX ORDER — METOPROLOL SUCCINATE 50 MG/1
TABLET, EXTENDED RELEASE ORAL
Qty: 90 TABLET | Refills: 1 | Status: SHIPPED | OUTPATIENT
Start: 2019-08-08 | End: 2020-02-06

## 2019-08-08 NOTE — TELEPHONE ENCOUNTER
Hypertension Medications Protocol Passed8/7 2:29 PM   CMP or BMP in past 12 months    Last serum creatinine< 2.0    Appointment in past 6 or next 3 months     LOV 6/6/19     LAST LAB 5/1/19     LAST RX  2/11/19 90 with 1 refill     Next OV No future appoin

## 2019-11-05 DIAGNOSIS — I10 ESSENTIAL HYPERTENSION: ICD-10-CM

## 2019-11-06 RX ORDER — IRBESARTAN 150 MG/1
TABLET ORAL
Qty: 90 TABLET | Refills: 0 | Status: SHIPPED | OUTPATIENT
Start: 2019-11-06 | End: 2020-02-06

## 2019-11-06 NOTE — TELEPHONE ENCOUNTER
Irbesartan 150 MG Oral Tab               Sig:  TAKE 1 TABLET(150 MG) BY MOUTH EVERY NIGHT    Disp:  90 tablet    Refills:  2    Start: 11/5/2019    Class: Normal    Non-formulary For: Essential hypertension    Last ordered: 8 months ago by Elmo Tate MD

## 2019-11-13 DIAGNOSIS — E78.2 MIXED HYPERLIPIDEMIA: ICD-10-CM

## 2019-11-13 NOTE — TELEPHONE ENCOUNTER
LOV 5/1/19     LAST LAB 5/1/19     LAST RX 2/25/19 30 tablet 0 refills    Next OV No future appointments.       PROTOCOL Cholesterol Medication Protocol [id number] 9:44 AM   ALT < 80    ALT resulted within past year    Lipid panel within past 12 months

## 2019-11-14 RX ORDER — ROSUVASTATIN CALCIUM 5 MG/1
5 TABLET, COATED ORAL NIGHTLY
Qty: 30 TABLET | Refills: 2 | Status: SHIPPED | OUTPATIENT
Start: 2019-11-14 | End: 2020-02-24

## 2019-11-14 NOTE — TELEPHONE ENCOUNTER
LOV 6/19     Rosuvastatin Calcium (CRESTOR) 5 MG Oral Tab 30 tablet 0 2/25/2019     Refilled x 3 months. Patient is noncompliant with his statin.

## 2020-02-05 DIAGNOSIS — I10 ESSENTIAL HYPERTENSION: ICD-10-CM

## 2020-02-05 DIAGNOSIS — G43.709 CHRONIC MIGRAINE WITHOUT AURA WITHOUT STATUS MIGRAINOSUS, NOT INTRACTABLE: ICD-10-CM

## 2020-02-06 NOTE — TELEPHONE ENCOUNTER
Olivier Blanc 6 hours ago (9:17 AM)         Spoke to patients daughter , she said she will call back to schedule appointment

## 2020-02-06 NOTE — TELEPHONE ENCOUNTER
LOV 6/19    LAST LAB 5/19    LAST RX   Irbesartan 150 MG Oral Tab 90 tablet 0 11/6/2019     Butalbital-APAP-Caffeine -40 MG Oral Cap 30 capsule 0 6/5/2019     Metoprolol Succinate ER 50 MG Oral Tablet 24 Hr 90 tablet 1 8/8/2019         Next OV Visit

## 2020-02-11 RX ORDER — TAMSULOSIN HYDROCHLORIDE 0.4 MG/1
CAPSULE ORAL
Qty: 15 CAPSULE | Refills: 0 | Status: SHIPPED | OUTPATIENT
Start: 2020-02-11

## 2020-02-11 RX ORDER — IRBESARTAN 150 MG/1
TABLET ORAL
Qty: 15 TABLET | Refills: 0 | Status: SHIPPED | OUTPATIENT
Start: 2020-02-11 | End: 2020-02-24

## 2020-02-11 RX ORDER — METOPROLOL SUCCINATE 50 MG/1
TABLET, EXTENDED RELEASE ORAL
Qty: 15 TABLET | Refills: 0 | Status: SHIPPED | OUTPATIENT
Start: 2020-02-11 | End: 2020-02-24

## 2020-02-11 RX ORDER — BUTALBITAL, ACETAMINOPHEN AND CAFFEINE 300; 40; 50 MG/1; MG/1; MG/1
CAPSULE ORAL
Qty: 15 CAPSULE | Refills: 0 | OUTPATIENT
Start: 2020-02-11

## 2020-02-11 RX ORDER — HYDROCHLOROTHIAZIDE 25 MG/1
25 TABLET ORAL
Qty: 15 TABLET | Refills: 0 | Status: SHIPPED | OUTPATIENT
Start: 2020-02-11 | End: 2020-02-24

## 2020-02-11 NOTE — TELEPHONE ENCOUNTER
See prior Te     Hypertension Medications Protocol Failed2/11 8:32 AM   Appointment in past 6 or next 3 months

## 2020-02-24 ENCOUNTER — OFFICE VISIT (OUTPATIENT)
Dept: FAMILY MEDICINE CLINIC | Facility: CLINIC | Age: 80
End: 2020-02-24
Payer: MEDICARE

## 2020-02-24 VITALS
BODY MASS INDEX: 25.59 KG/M2 | WEIGHT: 165 LBS | TEMPERATURE: 98 F | SYSTOLIC BLOOD PRESSURE: 122 MMHG | DIASTOLIC BLOOD PRESSURE: 68 MMHG | OXYGEN SATURATION: 98 % | RESPIRATION RATE: 16 BRPM | HEIGHT: 67.5 IN | HEART RATE: 87 BPM

## 2020-02-24 DIAGNOSIS — Z13.6 SCREENING FOR CARDIOVASCULAR CONDITION: ICD-10-CM

## 2020-02-24 DIAGNOSIS — E78.2 MIXED HYPERLIPIDEMIA: ICD-10-CM

## 2020-02-24 DIAGNOSIS — I10 ESSENTIAL HYPERTENSION: ICD-10-CM

## 2020-02-24 DIAGNOSIS — N40.1 BENIGN PROSTATIC HYPERPLASIA WITH LOWER URINARY TRACT SYMPTOMS, SYMPTOM DETAILS UNSPECIFIED: ICD-10-CM

## 2020-02-24 DIAGNOSIS — Z00.00 ENCOUNTER FOR ANNUAL HEALTH EXAMINATION: Primary | ICD-10-CM

## 2020-02-24 DIAGNOSIS — J44.9 CHRONIC OBSTRUCTIVE PULMONARY DISEASE, UNSPECIFIED COPD TYPE (HCC): ICD-10-CM

## 2020-02-24 DIAGNOSIS — G43.709 CHRONIC MIGRAINE WITHOUT AURA WITHOUT STATUS MIGRAINOSUS, NOT INTRACTABLE: ICD-10-CM

## 2020-02-24 DIAGNOSIS — L98.9 NON-HEALING SKIN LESION: ICD-10-CM

## 2020-02-24 PROCEDURE — 99397 PER PM REEVAL EST PAT 65+ YR: CPT | Performed by: FAMILY MEDICINE

## 2020-02-24 PROCEDURE — G0439 PPPS, SUBSEQ VISIT: HCPCS | Performed by: FAMILY MEDICINE

## 2020-02-24 PROCEDURE — 96160 PT-FOCUSED HLTH RISK ASSMT: CPT | Performed by: FAMILY MEDICINE

## 2020-02-24 RX ORDER — METOPROLOL SUCCINATE 50 MG/1
50 TABLET, EXTENDED RELEASE ORAL DAILY
Qty: 90 TABLET | Refills: 3 | Status: SHIPPED | OUTPATIENT
Start: 2020-02-24 | End: 2021-03-10

## 2020-02-24 RX ORDER — TAMSULOSIN HYDROCHLORIDE 0.4 MG/1
CAPSULE ORAL
Qty: 90 CAPSULE | Refills: 3 | Status: SHIPPED | OUTPATIENT
Start: 2020-02-24 | End: 2021-03-10

## 2020-02-24 RX ORDER — ASPIRIN 81 MG/1
81 TABLET ORAL
Qty: 90 TABLET | Refills: 3 | Status: SHIPPED | OUTPATIENT
Start: 2020-02-24

## 2020-02-24 RX ORDER — HYDROCHLOROTHIAZIDE 25 MG/1
25 TABLET ORAL
Qty: 90 TABLET | Refills: 3 | Status: SHIPPED | OUTPATIENT
Start: 2020-02-24 | End: 2021-03-10

## 2020-02-24 RX ORDER — IRBESARTAN 150 MG/1
150 TABLET ORAL DAILY
Qty: 90 TABLET | Refills: 3 | Status: SHIPPED | OUTPATIENT
Start: 2020-02-24 | End: 2021-03-10

## 2020-02-24 RX ORDER — BUTALBITAL, ACETAMINOPHEN AND CAFFEINE 300; 40; 50 MG/1; MG/1; MG/1
CAPSULE ORAL
Qty: 60 CAPSULE | Refills: 1 | Status: SHIPPED | OUTPATIENT
Start: 2020-02-24 | End: 2020-09-11

## 2020-02-24 NOTE — PATIENT INSTRUCTIONS
Caitlin Claudio's SCREENING SCHEDULE   Tests on this list are recommended by your physician but may not be covered, or covered at this frequency, by your insurer. Please check with your insurance carrier before scheduling to verify coverage.     JOHN Cancer Screening Covered up to Age 76     Colonoscopy Screen   Covered every 10 years- more often if abnormal There are no preventive care reminders to display for this patient.  Update [employer] if applicable    Flex Sigmoidoscopy Screen  Covered Not covered by Medicare Part B) No orders found for this or any previous visit.  This may be covered with your prescription benefits, but Medicare does not cover unless Medically needed    Zoster (Not covered by Medicare Part B) No orders found for this or

## 2020-02-24 NOTE — PROGRESS NOTES
HPI:   Ivana Appiah is a 67year old male who presents for a MA (Medicare Advantage) [facility] (Once per calendar year).     Patient with history of BPH hypertension asthma comes here today for his annual physical  He denies any complaints and has bee and had a score of 0 so is at low risk.      Patient Care Team: Patient Care Team:  Ashlee Lawrence MD as PCP - General (Family Medicine)  Ashlee Lawrence MD as PCP - [facility]    Patient Active Problem List:     Migraine without aura     Essential hypertension Tab, Take 1 tablet (10 mg total) by mouth daily. Fluticasone Propionate 50 MCG/ACT Nasal Suspension, 2 sprays by Each Nare route daily.   Fluticasone Propionate HFA (FLOVENT HFA) 220 MCG/ACT Inhalation Aerosol, Inhale 1 puff into the lungs 2 (two) times da Acuity: 20/40   Both Eyes Visual Acuity: Corrected Both Eyes Chart Acuity: 20/40   Able To Tolerate Visual Acuity: Yes        GENERAL: well developed, well nourished, in no apparent distress  SKIN: Patient on the bridge of the nose, 3 x 3 mm  HEENT: sho total) by mouth once daily.  -     Metoprolol Succinate ER 50 MG Oral Tablet 24 Hr; Take 1 tablet (50 mg total) by mouth daily.  -     Irbesartan 150 MG Oral Tab; Take 1 tablet (150 mg total) by mouth daily.     Screening for cardiovascular condition  - Annually No results found for: A1C    No flowsheet data found.     Fasting Blood Sugar (FSB)Annually Glucose (mg/dL)   Date Value   05/01/2019 103 (H)   06/19/2013 87       Cardiovascular Disease Screening     LDL Annually LDL Cholesterol Calc (mg/dL)   Elton covered with your prescription benefits, but Medicare does not cover unless Medically needed    Zoster   Not covered by Medicare Part B No vaccine history found This may be covered with your pharmacy  prescription benefits      SPECIFIC DISEASE MONITORING

## 2020-03-16 ENCOUNTER — TELEPHONE (OUTPATIENT)
Dept: FAMILY MEDICINE CLINIC | Facility: CLINIC | Age: 80
End: 2020-03-16

## 2020-03-16 NOTE — TELEPHONE ENCOUNTER
Pt's daughter called asking for Dr David Jaramillo to put in order for Oxygen at home. Existing condition. Pt daughter has a friend who will help get it. Will have his info when you call her.

## 2020-03-16 NOTE — TELEPHONE ENCOUNTER
Called and spoke to patient's daughter. She is very anxious about COVID-19 and is trying to be proactive to make sure her parents have what they need and don't have to go to the hospital during a crisis.   Reviewed chart and attempted to explain oxygen is

## 2020-03-17 NOTE — TELEPHONE ENCOUNTER
I have nothing more to add  Oxygen cannot be just prescribed  There is a criteria by the Medicare as well as pulmonary and critical care  If the patient is actually having difficulty in breathing he need to be examined in the emergency room or in our offic

## 2020-03-17 NOTE — TELEPHONE ENCOUNTER
Patient daughter insisting on O2 she wont take him to ER or have an appt. Says he has SOB every night. Says she will do an Evisit for this. Did mention Pulmonologist says she wont take him out of the house.

## 2020-06-02 ENCOUNTER — TELEPHONE (OUTPATIENT)
Dept: FAMILY MEDICINE CLINIC | Facility: CLINIC | Age: 80
End: 2020-06-02

## 2020-06-02 NOTE — TELEPHONE ENCOUNTER
Received a fax from patients pharmacy stating that toprol xl 50 mg is not covered by patient insurance . Preferred alternative is metoprolol succinate . Please advice .

## 2020-06-03 RX ORDER — METOPROLOL SUCCINATE 50 MG/1
50 TABLET, EXTENDED RELEASE ORAL DAILY
Qty: 90 TABLET | Refills: 0 | Status: CANCELLED | OUTPATIENT
Start: 2020-06-03

## 2020-06-03 NOTE — TELEPHONE ENCOUNTER
This is what has been ordered and filled in June.      Metoprolol Succinate ER 50 MG Oral Tablet 24 Hr 90 tablet 3 2/24/2020

## 2020-09-11 DIAGNOSIS — G43.709 CHRONIC MIGRAINE WITHOUT AURA WITHOUT STATUS MIGRAINOSUS, NOT INTRACTABLE: ICD-10-CM

## 2020-09-11 RX ORDER — BUTALBITAL, ACETAMINOPHEN AND CAFFEINE 300; 40; 50 MG/1; MG/1; MG/1
CAPSULE ORAL
Qty: 60 CAPSULE | Refills: 1 | Status: SHIPPED | OUTPATIENT
Start: 2020-09-11 | End: 2021-03-24

## 2020-09-11 NOTE — TELEPHONE ENCOUNTER
LOV 2/24/2020    LAST LAB     LAST RX 2/24/2020 60 capsule 1 refill    Next OV No future appointments. PROTOCOL none    Butalbital-APAP-Caffeine -40 MG Oral Cap               Sig:  TAKE ONE CAPSULE BY MOUTH EVERY 6 HOURS AS NEEDED for migraine he

## 2021-01-01 NOTE — TELEPHONE ENCOUNTER
Daughter stts lisinopril is making pt sick. Daughter asking to change medication. Please advise.
Please call the patient about the message below
Reason for Call/Chief Complaint: lisinopril causing symptoms of cough, sob  Onset: ongoing   Nursing Assessment/Associated Symptoms: Spoke to daughter Lei Torres, she is adamant about changing BP medication today; she states this has been on ongoing problem
Spoke to pt's daughter, pt was with her, gave permission to discuss his health care. Informed daughter that medication has been changed to Diovan, discontinue lisinopril and f/u in 7 days. Appt scheduled. Pt is to go to ED for increased sob/cough.  She voic
i will switch him to diovan. May stop lisinopril  He has to see me within 7 days. ,   i am going to give him one month fill Rx.    If worse to ER
1

## 2021-03-04 DIAGNOSIS — I10 ESSENTIAL HYPERTENSION: ICD-10-CM

## 2021-03-04 RX ORDER — IRBESARTAN 150 MG/1
150 TABLET ORAL DAILY
Qty: 90 TABLET | Refills: 3 | OUTPATIENT
Start: 2021-03-04

## 2021-03-04 NOTE — TELEPHONE ENCOUNTER
LOV 2/24/2020    LAST LAB 5/1/2019    LAST RX   Irbesartan 150 MG Oral Tab 90 tablet 3 2/24/2020         Next OV No future appointments.       PROTOCOL failed

## 2021-03-05 DIAGNOSIS — Z23 NEED FOR VACCINATION: ICD-10-CM

## 2021-03-09 DIAGNOSIS — N40.1 BENIGN PROSTATIC HYPERPLASIA WITH LOWER URINARY TRACT SYMPTOMS, SYMPTOM DETAILS UNSPECIFIED: ICD-10-CM

## 2021-03-09 DIAGNOSIS — I10 ESSENTIAL HYPERTENSION: ICD-10-CM

## 2021-03-09 NOTE — TELEPHONE ENCOUNTER
Pharmacy checking status on refill requests for:    Hydrochlorothiazide, metoprolol ER, tamsulosin, and urbisartin

## 2021-03-10 RX ORDER — TAMSULOSIN HYDROCHLORIDE 0.4 MG/1
CAPSULE ORAL
Qty: 90 CAPSULE | Refills: 3 | Status: SHIPPED | OUTPATIENT
Start: 2021-03-10 | End: 2021-05-18

## 2021-03-10 RX ORDER — METOPROLOL SUCCINATE 50 MG/1
50 TABLET, EXTENDED RELEASE ORAL DAILY
Qty: 90 TABLET | Refills: 3 | Status: SHIPPED | OUTPATIENT
Start: 2021-03-10 | End: 2021-05-18

## 2021-03-10 RX ORDER — IRBESARTAN 150 MG/1
150 TABLET ORAL DAILY
Qty: 90 TABLET | Refills: 3 | Status: SHIPPED | OUTPATIENT
Start: 2021-03-10 | End: 2021-05-18

## 2021-03-10 RX ORDER — HYDROCHLOROTHIAZIDE 25 MG/1
25 TABLET ORAL
Qty: 90 TABLET | Refills: 3 | Status: SHIPPED | OUTPATIENT
Start: 2021-03-10 | End: 2021-05-18

## 2021-03-10 NOTE — TELEPHONE ENCOUNTER
LOV 2/24/2020    LAST LAB 5/1/2019    LAST RX   tamsulosin (FLOMAX) cap 15 capsule 0 2/11/2020     hydrochlorothiazide 25 MG Oral Tab 90 tablet 3 2/24/2020     Metoprolol Succinate ER 50 MG Oral Tablet 24 Hr 90 tablet 3 2/24/2020       Irbesartan 150 MG Or

## 2021-03-22 DIAGNOSIS — G43.709 CHRONIC MIGRAINE WITHOUT AURA WITHOUT STATUS MIGRAINOSUS, NOT INTRACTABLE: ICD-10-CM

## 2021-03-22 NOTE — TELEPHONE ENCOUNTER
LOV    LAST LAB     LAST RX     Next OV   Future Appointments   Date Time Provider Phoebe Kitty   3/24/2021  1:00 PM Jaxon Cano MD [facility]

## 2021-03-24 ENCOUNTER — TELEPHONE (OUTPATIENT)
Dept: FAMILY MEDICINE CLINIC | Facility: CLINIC | Age: 81
End: 2021-03-24

## 2021-03-24 DIAGNOSIS — G43.709 CHRONIC MIGRAINE WITHOUT AURA WITHOUT STATUS MIGRAINOSUS, NOT INTRACTABLE: ICD-10-CM

## 2021-03-24 RX ORDER — BUTALBITAL, ACETAMINOPHEN AND CAFFEINE 300; 40; 50 MG/1; MG/1; MG/1
CAPSULE ORAL
Qty: 60 CAPSULE | Refills: 1 | Status: SHIPPED | OUTPATIENT
Start: 2021-03-24 | End: 2021-05-18

## 2021-03-24 RX ORDER — BUTALBITAL, ACETAMINOPHEN AND CAFFEINE 300; 40; 50 MG/1; MG/1; MG/1
CAPSULE ORAL
Qty: 60 CAPSULE | Refills: 1 | Status: SHIPPED | OUTPATIENT
Start: 2021-03-24 | End: 2021-03-24

## 2021-03-24 NOTE — TELEPHONE ENCOUNTER
Pharmacy called and said they do not have Butalbital Rx in stock said they called [facility] on [address] and they do have Rx need new script sent to that location unable to transfer

## 2021-03-24 NOTE — TELEPHONE ENCOUNTER
[phone]   for pt (Suni Lopes per HIPAA) to inform if was not aware already f/u as per [facility] on [facility] indicated they do not have Butalbital rx in stock, however, they called [facility] on [address] [id number] in [facility] and confirmed this rx is availabl

## 2021-03-25 ENCOUNTER — TELEPHONE (OUTPATIENT)
Dept: FAMILY MEDICINE CLINIC | Facility: CLINIC | Age: 81
End: 2021-03-25

## 2021-04-21 ENCOUNTER — TELEPHONE (OUTPATIENT)
Dept: FAMILY MEDICINE CLINIC | Facility: CLINIC | Age: 81
End: 2021-04-21

## 2021-04-21 NOTE — TELEPHONE ENCOUNTER
Wants to use [facility]   please  Fax lab orders, unable to schedule until orders are received.

## 2021-04-21 NOTE — TELEPHONE ENCOUNTER
Received fax back from [employer]:    Others box checked- \"[employer] out of network\"    Notes at bottom- \"Orders will NOT be serviced by [facility]

## 2021-04-21 NOTE — TELEPHONE ENCOUNTER
Pt contact returned call- said they have ENRIQUE Dalal 9. Informed her she would need to contact [employer] to see if that would cover labs.

## 2021-04-26 ENCOUNTER — TELEPHONE (OUTPATIENT)
Dept: FAMILY MEDICINE CLINIC | Facility: CLINIC | Age: 81
End: 2021-04-26

## 2021-04-26 DIAGNOSIS — Z00.00 ENCOUNTER FOR ANNUAL HEALTH EXAMINATION: Primary | ICD-10-CM

## 2021-04-26 NOTE — TELEPHONE ENCOUNTER
[facility] is not in network with insurance. Daughter wants us to send orders to [facility].  She is under the inpression that [facility] will contact her once orders are received to schedule Home Visit for Lab Service.

## 2021-04-28 NOTE — TELEPHONE ENCOUNTER
Spoke to patient's daughter and gave contact information for [facility]:    [facility]  Same day visits available!  [phone]

## 2021-04-28 NOTE — TELEPHONE ENCOUNTER
Daughter called back and [facility] said they do not service the [place] area for home blood draws.     Advised she should research and look for another service in the area.   [facility] was ready to come and draw blood but said they only had one of

## 2021-04-28 NOTE — TELEPHONE ENCOUNTER
Pt's daughter called to check status of lab orders, informed her Lab orders have been entered to [facility], she wants to know who she is supposed to call to have [facility]  come to home to draw labs?  Said she has been waiting 2 weeks to get lab work done a

## 2021-05-18 DIAGNOSIS — G43.709 CHRONIC MIGRAINE WITHOUT AURA WITHOUT STATUS MIGRAINOSUS, NOT INTRACTABLE: ICD-10-CM

## 2021-05-18 DIAGNOSIS — N40.1 BENIGN PROSTATIC HYPERPLASIA WITH LOWER URINARY TRACT SYMPTOMS, SYMPTOM DETAILS UNSPECIFIED: ICD-10-CM

## 2021-05-18 DIAGNOSIS — I10 ESSENTIAL HYPERTENSION: ICD-10-CM

## 2021-05-18 RX ORDER — IRBESARTAN 150 MG/1
150 TABLET ORAL DAILY
Qty: 30 TABLET | Refills: 0 | Status: SHIPPED | OUTPATIENT
Start: 2021-05-18 | End: 2021-05-26

## 2021-05-18 RX ORDER — TAMSULOSIN HYDROCHLORIDE 0.4 MG/1
CAPSULE ORAL
Qty: 30 CAPSULE | Refills: 0 | Status: SHIPPED | OUTPATIENT
Start: 2021-05-18 | End: 2021-05-26

## 2021-05-18 RX ORDER — BUTALBITAL, ACETAMINOPHEN AND CAFFEINE 300; 40; 50 MG/1; MG/1; MG/1
CAPSULE ORAL
Qty: 60 CAPSULE | Refills: 0 | Status: SHIPPED | OUTPATIENT
Start: 2021-05-18 | End: 2021-05-26

## 2021-05-18 RX ORDER — METOPROLOL SUCCINATE 50 MG/1
50 TABLET, EXTENDED RELEASE ORAL DAILY
Qty: 30 TABLET | Refills: 0 | Status: SHIPPED | OUTPATIENT
Start: 2021-05-18 | End: 2021-05-26

## 2021-05-18 RX ORDER — HYDROCHLOROTHIAZIDE 25 MG/1
25 TABLET ORAL
Qty: 30 TABLET | Refills: 0 | Status: SHIPPED | OUTPATIENT
Start: 2021-05-18 | End: 2021-05-26

## 2021-05-18 NOTE — TELEPHONE ENCOUNTER
Dtr called  today indicating she is waiting for results. Would not hang up until she spoke with someone. I let her know results would be mailed to her. She acknowledged that she did get the dismissal letter but thought it was due to insurance.  Pete braga

## 2021-05-19 ENCOUNTER — MED REC SCAN ONLY (OUTPATIENT)
Dept: FAMILY MEDICINE CLINIC | Facility: CLINIC | Age: 81
End: 2021-05-19

## 2023-04-20 ENCOUNTER — LABORATORY ENCOUNTER (OUTPATIENT)
Dept: LAB | Age: 83
End: 2023-04-20
Attending: OTOLARYNGOLOGY
Payer: MEDICARE

## 2023-04-20 ENCOUNTER — EKG ENCOUNTER (OUTPATIENT)
Dept: LAB | Age: 83
End: 2023-04-20
Attending: OTOLARYNGOLOGY
Payer: MEDICARE

## 2023-04-20 DIAGNOSIS — Z01.818 PRE-OP TESTING: ICD-10-CM

## 2023-04-20 LAB
ANION GAP SERPL CALC-SCNC: 6 MMOL/L (ref 0–18)
ATRIAL RATE: 66 BPM
BUN BLD-MCNC: 23 MG/DL (ref 7–18)
CALCIUM BLD-MCNC: 9.4 MG/DL (ref 8.5–10.1)
CHLORIDE SERPL-SCNC: 107 MMOL/L (ref 98–112)
CO2 SERPL-SCNC: 23 MMOL/L (ref 21–32)
CREAT BLD-MCNC: 1.11 MG/DL
GFR SERPLBLD BASED ON 1.73 SQ M-ARVRAT: 67 ML/MIN/1.73M2 (ref 60–?)
GLUCOSE BLD-MCNC: 88 MG/DL (ref 70–99)
OSMOLALITY SERPL CALC.SUM OF ELEC: 285 MOSM/KG (ref 275–295)
P AXIS: 75 DEGREES
P-R INTERVAL: 196 MS
POTASSIUM SERPL-SCNC: 3.7 MMOL/L (ref 3.5–5.1)
Q-T INTERVAL: 422 MS
QRS DURATION: 146 MS
QTC CALCULATION (BEZET): 442 MS
R AXIS: 48 DEGREES
SODIUM SERPL-SCNC: 136 MMOL/L (ref 136–145)
T AXIS: 56 DEGREES
VENTRICULAR RATE: 66 BPM

## 2023-04-20 PROCEDURE — 36415 COLL VENOUS BLD VENIPUNCTURE: CPT

## 2023-04-20 PROCEDURE — 93010 ELECTROCARDIOGRAM REPORT: CPT | Performed by: INTERNAL MEDICINE

## 2023-04-20 PROCEDURE — 80048 BASIC METABOLIC PNL TOTAL CA: CPT

## 2023-04-20 PROCEDURE — 93005 ELECTROCARDIOGRAM TRACING: CPT

## 2023-04-25 RX ORDER — HYDROCHLOROTHIAZIDE 25 MG/1
25 TABLET ORAL
COMMUNITY
Start: 2023-01-30

## 2023-04-25 RX ORDER — TAMSULOSIN HYDROCHLORIDE 0.4 MG/1
0.4 CAPSULE ORAL
COMMUNITY
Start: 2023-01-30

## 2023-04-25 RX ORDER — IRBESARTAN 150 MG/1
150 TABLET ORAL
COMMUNITY
Start: 2023-01-30

## 2023-04-25 RX ORDER — METOPROLOL SUCCINATE 50 MG/1
50 TABLET, EXTENDED RELEASE ORAL
COMMUNITY
Start: 2023-01-30

## 2023-04-28 ENCOUNTER — OFFICE VISIT (OUTPATIENT)
Dept: CARDIOLOGY | Age: 83
End: 2023-04-28

## 2023-04-28 VITALS
BODY MASS INDEX: 25.31 KG/M2 | OXYGEN SATURATION: 96 % | DIASTOLIC BLOOD PRESSURE: 78 MMHG | HEIGHT: 68 IN | HEART RATE: 67 BPM | WEIGHT: 167 LBS | SYSTOLIC BLOOD PRESSURE: 128 MMHG

## 2023-04-28 DIAGNOSIS — R42 DIZZINESS: ICD-10-CM

## 2023-04-28 DIAGNOSIS — Z01.810 PRE-OPERATIVE CARDIOVASCULAR EXAMINATION: Primary | ICD-10-CM

## 2023-04-28 DIAGNOSIS — N40.0 BENIGN PROSTATIC HYPERPLASIA WITHOUT LOWER URINARY TRACT SYMPTOMS: ICD-10-CM

## 2023-04-28 DIAGNOSIS — I10 HYPERTENSION, UNSPECIFIED TYPE: ICD-10-CM

## 2023-04-28 PROCEDURE — 99204 OFFICE O/P NEW MOD 45 MIN: CPT | Performed by: INTERNAL MEDICINE

## 2023-04-28 RX ORDER — MECLIZINE HYDROCHLORIDE 25 MG/1
TABLET ORAL
COMMUNITY
Start: 2023-01-30

## 2023-04-28 RX ORDER — ALBUTEROL SULFATE 90 UG/1
2 AEROSOL, METERED RESPIRATORY (INHALATION)
COMMUNITY
Start: 2023-04-26

## 2023-04-28 SDOH — HEALTH STABILITY: PHYSICAL HEALTH: ON AVERAGE, HOW MANY MINUTES DO YOU ENGAGE IN EXERCISE AT THIS LEVEL?: 20 MIN

## 2023-04-28 SDOH — HEALTH STABILITY: PHYSICAL HEALTH: ON AVERAGE, HOW MANY DAYS PER WEEK DO YOU ENGAGE IN MODERATE TO STRENUOUS EXERCISE (LIKE A BRISK WALK)?: 7 DAYS

## 2023-04-28 ASSESSMENT — PATIENT HEALTH QUESTIONNAIRE - PHQ9
CLINICAL INTERPRETATION OF PHQ2 SCORE: NO FURTHER SCREENING NEEDED
SUM OF ALL RESPONSES TO PHQ9 QUESTIONS 1 AND 2: 0
SUM OF ALL RESPONSES TO PHQ9 QUESTIONS 1 AND 2: 0
1. LITTLE INTEREST OR PLEASURE IN DOING THINGS: NOT AT ALL
2. FEELING DOWN, DEPRESSED OR HOPELESS: NOT AT ALL

## 2023-05-03 ENCOUNTER — ANESTHESIA (OUTPATIENT)
Dept: SURGERY | Facility: HOSPITAL | Age: 83
End: 2023-05-03
Payer: MEDICARE

## 2023-05-03 ENCOUNTER — HOSPITAL ENCOUNTER (OUTPATIENT)
Facility: HOSPITAL | Age: 83
Discharge: HOME | End: 2023-05-03
Attending: OTOLARYNGOLOGY | Admitting: OTOLARYNGOLOGY
Payer: MEDICARE

## 2023-05-03 ENCOUNTER — ANESTHESIA EVENT (OUTPATIENT)
Dept: SURGERY | Facility: HOSPITAL | Age: 83
End: 2023-05-03
Payer: MEDICARE

## 2023-05-03 VITALS
RESPIRATION RATE: 20 BRPM | BODY MASS INDEX: 25.31 KG/M2 | SYSTOLIC BLOOD PRESSURE: 158 MMHG | TEMPERATURE: 98 F | OXYGEN SATURATION: 90 % | DIASTOLIC BLOOD PRESSURE: 89 MMHG | HEART RATE: 90 BPM | WEIGHT: 167 LBS | HEIGHT: 68 IN

## 2023-05-03 DIAGNOSIS — Z01.818 PRE-OP TESTING: Primary | ICD-10-CM

## 2023-05-03 PROCEDURE — 0CB90ZZ EXCISION OF LEFT PAROTID GLAND, OPEN APPROACH: ICD-10-PCS | Performed by: OTOLARYNGOLOGY

## 2023-05-03 PROCEDURE — 88342 IMHCHEM/IMCYTCHM 1ST ANTB: CPT | Performed by: OTOLARYNGOLOGY

## 2023-05-03 PROCEDURE — 00BM0ZZ EXCISION OF FACIAL NERVE, OPEN APPROACH: ICD-10-PCS | Performed by: OTOLARYNGOLOGY

## 2023-05-03 PROCEDURE — 88307 TISSUE EXAM BY PATHOLOGIST: CPT | Performed by: OTOLARYNGOLOGY

## 2023-05-03 RX ORDER — ACETAMINOPHEN 325 MG/1
650 TABLET ORAL ONCE
Status: DISCONTINUED | OUTPATIENT
Start: 2023-05-03 | End: 2023-05-03

## 2023-05-03 RX ORDER — LIDOCAINE HYDROCHLORIDE 10 MG/ML
INJECTION, SOLUTION EPIDURAL; INFILTRATION; INTRACAUDAL; PERINEURAL AS NEEDED
Status: DISCONTINUED | OUTPATIENT
Start: 2023-05-03 | End: 2023-05-03 | Stop reason: SURG

## 2023-05-03 RX ORDER — METOPROLOL TARTRATE 5 MG/5ML
2.5 INJECTION INTRAVENOUS ONCE
Status: COMPLETED | OUTPATIENT
Start: 2023-05-03 | End: 2023-05-03

## 2023-05-03 RX ORDER — SODIUM CHLORIDE, SODIUM LACTATE, POTASSIUM CHLORIDE, CALCIUM CHLORIDE 600; 310; 30; 20 MG/100ML; MG/100ML; MG/100ML; MG/100ML
INJECTION, SOLUTION INTRAVENOUS CONTINUOUS
Status: DISCONTINUED | OUTPATIENT
Start: 2023-05-03 | End: 2023-05-03

## 2023-05-03 RX ORDER — NALOXONE HYDROCHLORIDE 0.4 MG/ML
80 INJECTION, SOLUTION INTRAMUSCULAR; INTRAVENOUS; SUBCUTANEOUS AS NEEDED
Status: DISCONTINUED | OUTPATIENT
Start: 2023-05-03 | End: 2023-05-03

## 2023-05-03 RX ORDER — TAMSULOSIN HYDROCHLORIDE 0.4 MG/1
0.4 CAPSULE ORAL
COMMUNITY

## 2023-05-03 RX ORDER — HYDROCODONE BITARTRATE AND ACETAMINOPHEN 7.5; 325 MG/1; MG/1
1 TABLET ORAL EVERY 6 HOURS PRN
Qty: 20 TABLET | Refills: 0 | Status: SHIPPED | OUTPATIENT
Start: 2023-05-03

## 2023-05-03 RX ORDER — ACETAMINOPHEN 500 MG
1000 TABLET ORAL ONCE AS NEEDED
Status: DISCONTINUED | OUTPATIENT
Start: 2023-05-03 | End: 2023-05-03

## 2023-05-03 RX ORDER — ACETAMINOPHEN 500 MG
1000 TABLET ORAL ONCE
Status: DISCONTINUED | OUTPATIENT
Start: 2023-05-03 | End: 2023-05-03 | Stop reason: HOSPADM

## 2023-05-03 RX ORDER — PHENYLEPHRINE HCL 10 MG/ML
VIAL (ML) INJECTION AS NEEDED
Status: DISCONTINUED | OUTPATIENT
Start: 2023-05-03 | End: 2023-05-03 | Stop reason: SURG

## 2023-05-03 RX ORDER — ONDANSETRON 2 MG/ML
INJECTION INTRAMUSCULAR; INTRAVENOUS AS NEEDED
Status: DISCONTINUED | OUTPATIENT
Start: 2023-05-03 | End: 2023-05-03 | Stop reason: SURG

## 2023-05-03 RX ORDER — LIDOCAINE HYDROCHLORIDE AND EPINEPHRINE 10; 10 MG/ML; UG/ML
INJECTION, SOLUTION INFILTRATION; PERINEURAL AS NEEDED
Status: DISCONTINUED | OUTPATIENT
Start: 2023-05-03 | End: 2023-05-03 | Stop reason: HOSPADM

## 2023-05-03 RX ORDER — HYDROMORPHONE HYDROCHLORIDE 1 MG/ML
0.2 INJECTION, SOLUTION INTRAMUSCULAR; INTRAVENOUS; SUBCUTANEOUS EVERY 5 MIN PRN
Status: DISCONTINUED | OUTPATIENT
Start: 2023-05-03 | End: 2023-05-03

## 2023-05-03 RX ORDER — ROCURONIUM BROMIDE 10 MG/ML
INJECTION, SOLUTION INTRAVENOUS AS NEEDED
Status: DISCONTINUED | OUTPATIENT
Start: 2023-05-03 | End: 2023-05-03 | Stop reason: SURG

## 2023-05-03 RX ORDER — METOPROLOL TARTRATE 5 MG/5ML
INJECTION INTRAVENOUS
Status: COMPLETED
Start: 2023-05-03 | End: 2023-05-03

## 2023-05-03 RX ORDER — HYDROCODONE BITARTRATE AND ACETAMINOPHEN 5; 325 MG/1; MG/1
2 TABLET ORAL ONCE AS NEEDED
Status: DISCONTINUED | OUTPATIENT
Start: 2023-05-03 | End: 2023-05-03

## 2023-05-03 RX ORDER — METOCLOPRAMIDE HYDROCHLORIDE 5 MG/ML
10 INJECTION INTRAMUSCULAR; INTRAVENOUS EVERY 8 HOURS PRN
Status: DISCONTINUED | OUTPATIENT
Start: 2023-05-03 | End: 2023-05-03

## 2023-05-03 RX ORDER — DEXAMETHASONE SODIUM PHOSPHATE 4 MG/ML
VIAL (ML) INJECTION AS NEEDED
Status: DISCONTINUED | OUTPATIENT
Start: 2023-05-03 | End: 2023-05-03 | Stop reason: SURG

## 2023-05-03 RX ORDER — HYDROMORPHONE HYDROCHLORIDE 1 MG/ML
0.6 INJECTION, SOLUTION INTRAMUSCULAR; INTRAVENOUS; SUBCUTANEOUS EVERY 5 MIN PRN
Status: DISCONTINUED | OUTPATIENT
Start: 2023-05-03 | End: 2023-05-03

## 2023-05-03 RX ORDER — CEFAZOLIN SODIUM 1 G/3ML
INJECTION, POWDER, FOR SOLUTION INTRAMUSCULAR; INTRAVENOUS AS NEEDED
Status: DISCONTINUED | OUTPATIENT
Start: 2023-05-03 | End: 2023-05-03 | Stop reason: SURG

## 2023-05-03 RX ORDER — HYDROMORPHONE HYDROCHLORIDE 1 MG/ML
0.4 INJECTION, SOLUTION INTRAMUSCULAR; INTRAVENOUS; SUBCUTANEOUS EVERY 5 MIN PRN
Status: DISCONTINUED | OUTPATIENT
Start: 2023-05-03 | End: 2023-05-03

## 2023-05-03 RX ORDER — HYDROCODONE BITARTRATE AND ACETAMINOPHEN 5; 325 MG/1; MG/1
1 TABLET ORAL ONCE AS NEEDED
Status: DISCONTINUED | OUTPATIENT
Start: 2023-05-03 | End: 2023-05-03

## 2023-05-03 RX ORDER — ONDANSETRON 2 MG/ML
4 INJECTION INTRAMUSCULAR; INTRAVENOUS EVERY 6 HOURS PRN
Status: DISCONTINUED | OUTPATIENT
Start: 2023-05-03 | End: 2023-05-03

## 2023-05-03 RX ORDER — LABETALOL HYDROCHLORIDE 5 MG/ML
5 INJECTION, SOLUTION INTRAVENOUS EVERY 5 MIN PRN
Status: DISCONTINUED | OUTPATIENT
Start: 2023-05-03 | End: 2023-05-03

## 2023-05-03 RX ORDER — EPHEDRINE SULFATE 50 MG/ML
INJECTION INTRAVENOUS AS NEEDED
Status: DISCONTINUED | OUTPATIENT
Start: 2023-05-03 | End: 2023-05-03 | Stop reason: SURG

## 2023-05-03 RX ADMIN — EPHEDRINE SULFATE 10 MG: 50 INJECTION INTRAVENOUS at 10:10:00

## 2023-05-03 RX ADMIN — ONDANSETRON 4 MG: 2 INJECTION INTRAMUSCULAR; INTRAVENOUS at 11:03:00

## 2023-05-03 RX ADMIN — PHENYLEPHRINE HCL 50 MCG: 10 MG/ML VIAL (ML) INJECTION at 10:26:00

## 2023-05-03 RX ADMIN — ROCURONIUM BROMIDE 10 MG: 10 INJECTION, SOLUTION INTRAVENOUS at 09:49:00

## 2023-05-03 RX ADMIN — DEXAMETHASONE SODIUM PHOSPHATE 8 MG: 4 MG/ML VIAL (ML) INJECTION at 09:52:00

## 2023-05-03 RX ADMIN — EPHEDRINE SULFATE 5 MG: 50 INJECTION INTRAVENOUS at 10:16:00

## 2023-05-03 RX ADMIN — EPHEDRINE SULFATE 5 MG: 50 INJECTION INTRAVENOUS at 10:02:00

## 2023-05-03 RX ADMIN — SODIUM CHLORIDE, SODIUM LACTATE, POTASSIUM CHLORIDE, CALCIUM CHLORIDE: 600; 310; 30; 20 INJECTION, SOLUTION INTRAVENOUS at 09:43:00

## 2023-05-03 RX ADMIN — CEFAZOLIN SODIUM 2 G: 1 INJECTION, POWDER, FOR SOLUTION INTRAMUSCULAR; INTRAVENOUS at 09:52:00

## 2023-05-03 RX ADMIN — LIDOCAINE HYDROCHLORIDE 50 MG: 10 INJECTION, SOLUTION EPIDURAL; INFILTRATION; INTRACAUDAL; PERINEURAL at 09:49:00

## 2023-05-03 NOTE — OPERATIVE REPORT
PATIENT NAME: Shay Luo   MRN: [id number]   DATE OF OPERATION: 5/3/2023     PREOPERATIVE DIAGNOSIS:    1. left parotid mass    POSTOPERATIVE DIAGNOSIS:   1. left parotid mass    PROCEDURE:    1. left superficial parotidectomy    SURGEON: Diane Olivera MD     ASSISTANT: Jaziel Sandoval MD    ANESTHESIA: General.     INDICATION: The patient is a 81year old male who presents with a parotid mass. Discussions were held with the patient regarding treatment options, including observation or surgery. The patient decided to go ahead with the procedure, giving written consent. he was eager to proceed. FINDINGS: left cystic parotid mass    DESCRIPTION OF PROCEDURE: Patient was identified in the preop holding area and again in the operating room. The patient was turned over to the anesthesiologist for sedation and intubation. The patient was sedated and intubated without complication. A time out was performed confirming the patient's name, age, , the procedure including location, allergies, and any medications. The patient was then turned over to the surgeon for the procedure. Attention was turned to facial nerve monitoring. After leads were placed, the monitor was turned on and facial nerve monitoring proceeded. The nerve stimulating probe was used throughout the procedure to identify and confirm function of the right facial nerve and its branches. Attention was turned to the left superficial parotidectomy. Lidocaine 1% with 1/100,000 of epinephrine was placed in the incision. A 15 blade scalpel was then used to make a face-lift type incision along the root of the auricle extending inferiorly to the angle of mandible. Hemostasis was acheived with electroacutery. A skin flap was raised with metzenbaum scissors, making pockets in the SMAS and then connecting these pockets. The flap was secured with lonestars. The greater auricular nerve was identified and dissected carefully. It was then retracted posteriorly. The SCM was then identified and dissected from the posterior parotid gland. Dissection was carried anterior the deep to the SCM using a metzenbaum scissors  until the posterior belly of the digastric was identified. The mass was noted to be superficial. The marginal mandibular nerve was easily identified along the anterior aspect of the mass and dissected posterior superiorly. The marginal mandibular nerve was  from the mass and the mass was then dissected with the associated parotid tissue. Facial nerve function was then confirmed. Hemostasis was then obtained. Deep tissue was then reapproximated with interrupted 3.0 and 4.0 vicryl. Finally, skin was reapproximated with dermabond. Patient was then turned over to the anesthesiologist for wake-up. Patient woke without complications. The patient was extubated and transferred from the OR table to the stretcher and from the stretcher to the PACU in stable condition. SPECIMENS: left parotid mass    ESTIMATED BLOOD LOSS: 10 mls. COMPLICATIONS: None.      PLAN: discharge

## 2023-05-03 NOTE — ANESTHESIA POSTPROCEDURE EVALUATION
[address] Patient Status:  Outpatient in a Bed   Age/Gender 81year old male MRN [id number]   Location [facility] Attending Nunu Kearns MD   Hosp Day # 0 PCP Governor Edie MD       Anesthesia Post-op Note    LEFT PAROTIDECTOMY    Procedure Summary     Date: 05/03/23 Room / Location: [facility] OR 06 / 1404 [facility] OR    Anesthesia Start: 8436 Anesthesia Stop: 3376    Procedure: LEFT PAROTIDECTOMY (Left: Face) Diagnosis: (LEFT PAROTID MASS)    Surgeons: Nunu Kearns MD Anesthesiologist: Casimiro Scheuermann, MD    Anesthesia Type: general ASA Status: 2          Anesthesia Type: general    Vitals Value Taken Time   /59 05/03/23 1140   Temp 97 05/03/23 1143   Pulse 93 05/03/23 1143   Resp 16 05/03/23 1143   SpO2 96 % 05/03/23 1143   Vitals shown include unvalidated device data. Patient Location: PACU    Anesthesia Type: general    Airway Patency: patent    Postop Pain Control: adequate    Mental Status: preanesthetic baseline    Nausea/Vomiting: none    Cardiopulmonary/Hydration status: stable euvolemic    Complications: anesthesia related complications, see comments (Small laceration above top lip during reintubation, bacitration applied)    Postop vital signs: stable    Dental Exam: Unchanged from Preop    Patient to be discharged home when criteria met.

## 2023-05-03 NOTE — ANESTHESIA PROCEDURE NOTES
Airway  Date/Time: 5/3/2023 9:50 AM  Urgency: elective      General Information and Staff    Patient location during procedure: OR  Anesthesiologist: Claudia Diaz MD  Performed: anesthesiologist   Performed by: Claudia Diaz MD  Authorized by: Claudia Diaz MD      Indications and Patient Condition  Indications for airway management: anesthesia  Spontaneous Ventilation: absent  Sedation level: deep  Preoxygenated: yes  Patient position: sniffing  Mask difficulty assessment: 1 - vent by mask    Final Airway Details  Final airway type: endotracheal airway      Successful airway: ETT  Cuffed: yes   Successful intubation technique: direct laryngoscopy  Endotracheal tube insertion site: oral  Blade: Nikita  Blade size: #3  ETT size (mm): 7.5    Cormack-Lehane Classification: grade I - full view of glottis  Placement verified by: chest auscultation and capnometry   Cuff volume (mL): 5  Measured from: lips  ETT to lips (cm): 20  Number of attempts at approach: 1

## 2023-05-03 NOTE — H&P
Pre-op Diagnosis: LEFT PAROTID MASS    The H&P by Dr. Glenroy Xie dated 4/26/23 and Dr. Genna Corona dated 4/28/23 in care everywhere and EMR was reviewed by Tin Lynch MD on 5/3/2023, the patient was examined and no significant changes have occurred in the patient's condition since the H&P was performed. I discussed with the patient and/or legal representative the potential benefits, risks and side effects of this procedure; the likelihood of the patient achieving goals; and potential problems that might occur during recuperation. I discussed reasonable alternatives to the procedure, including risks, benefits and side effects related to the alternatives and risks related to not receiving this procedure. We will proceed with procedure as planned.

## 2024-11-07 ENCOUNTER — APPOINTMENT (OUTPATIENT)
Dept: GENERAL RADIOLOGY | Facility: HOSPITAL | Age: 84
End: 2024-11-07
Attending: EMERGENCY MEDICINE
Payer: MEDICARE

## 2024-11-07 ENCOUNTER — HOSPITAL ENCOUNTER (EMERGENCY)
Facility: HOSPITAL | Age: 84
Discharge: LEFT BEFORE DISCHARGE | End: 2024-11-07
Attending: EMERGENCY MEDICINE
Payer: MEDICARE

## 2024-11-07 VITALS
RESPIRATION RATE: 21 BRPM | SYSTOLIC BLOOD PRESSURE: 165 MMHG | TEMPERATURE: 98 F | OXYGEN SATURATION: 100 % | DIASTOLIC BLOOD PRESSURE: 78 MMHG | HEART RATE: 71 BPM | BODY MASS INDEX: 23.34 KG/M2 | WEIGHT: 154 LBS | HEIGHT: 68 IN

## 2024-11-07 DIAGNOSIS — J44.1 COPD EXACERBATION (HCC): Primary | ICD-10-CM

## 2024-11-07 DIAGNOSIS — R06.02 SHORTNESS OF BREATH: ICD-10-CM

## 2024-11-07 LAB
ATRIAL RATE: 73 BPM
P AXIS: 80 DEGREES
P-R INTERVAL: 214 MS
Q-T INTERVAL: 424 MS
QRS DURATION: 144 MS
QTC CALCULATION (BEZET): 467 MS
R AXIS: 46 DEGREES
T AXIS: 52 DEGREES
VENTRICULAR RATE: 73 BPM

## 2024-11-07 PROCEDURE — 99284 EMERGENCY DEPT VISIT MOD MDM: CPT

## 2024-11-07 PROCEDURE — 94640 AIRWAY INHALATION TREATMENT: CPT

## 2024-11-07 PROCEDURE — 96374 THER/PROPH/DIAG INJ IV PUSH: CPT

## 2024-11-07 PROCEDURE — 93005 ELECTROCARDIOGRAM TRACING: CPT

## 2024-11-07 PROCEDURE — 93010 ELECTROCARDIOGRAM REPORT: CPT

## 2024-11-07 RX ORDER — METHYLPREDNISOLONE SODIUM SUCCINATE 125 MG/2ML
125 INJECTION INTRAMUSCULAR; INTRAVENOUS ONCE
Status: COMPLETED | OUTPATIENT
Start: 2024-11-07 | End: 2024-11-07

## 2024-11-07 RX ORDER — IPRATROPIUM BROMIDE AND ALBUTEROL SULFATE 2.5; .5 MG/3ML; MG/3ML
3 SOLUTION RESPIRATORY (INHALATION) ONCE
Status: COMPLETED | OUTPATIENT
Start: 2024-11-07 | End: 2024-11-07

## 2024-11-07 RX ORDER — ALBUTEROL SULFATE 90 UG/1
2 INHALANT RESPIRATORY (INHALATION) EVERY 4 HOURS PRN
Qty: 1 EACH | Refills: 0 | Status: SHIPPED | OUTPATIENT
Start: 2024-11-07 | End: 2024-12-07

## 2024-11-07 NOTE — ED PROVIDER NOTES
Patient Seen in: [facility] Emergency Department      History     Chief Complaint   Patient presents with    Difficulty Breathing     Stated Complaint: C/O NAYE X 1 HR    Subjective:   HPI      The 84-year-old male presented with shortness of breath. There was no reported tightness in the chest or sore throat. The patient has a history of COPD and asthma, but the current symptoms did not align with a typical exacerbation of these conditions. The patient had tried using inhalers at home, but they did not alleviate the symptoms. There was no mention of when the shortness of breath started or any specific triggers. The patient's sister had insisted on him seeking medical attention. The severity of the shortness of breath was not specified, but it was severe enough to warrant an emergency visit. The patient was given a steroid and a breathing treatment was ordered, although it was noted that this might not be effective.  The daughter states she does not want any labs or any x-ray as does not feel is necessary    Objective:     Past Medical History:    Asthma (HCC)    HEADACHES    High blood pressure    HYPERTENSION    Migraine without aura, without mention of intractable migraine without mention of status migrainosus    Migraines    RBBB              No pertinent past surgical history.              Social History     Socioeconomic History    Marital status:     Number of children: 2   Occupational History    Occupation: school      Employer: [employer]   Tobacco Use    Smoking status: Former     Current packs/day: 0.00     Average packs/day: 1 pack/day for 30.0 years (30.0 ttl pk-yrs)     Types: Cigarettes     Start date: 10/1/1983     Quit date: 10/1/2013     Years since quittin.1    Smokeless tobacco: Never    Tobacco comments:     quit 4 months ago, smoked for 30 yrs.   Vaping Use    Vaping status: Never Used   Substance and Sexual Activity    Alcohol use: No    Drug use: No    Other Topics Concern    Caffeine Concern Yes     Comment: coffee     Social Drivers of Health     Physical Activity: Insufficiently Active (4/28/2023)    [facility]    Exercise Vital Sign     On average, how many days per week do you engage in moderate to strenuous exercise (like a brisk walk)?: 7 days     On average, how many minutes do you engage in exercise at this level?: 20 min                  Physical Exam     ED Triage Vitals [11/07/24 0329]   BP (!) 165/78   Pulse 71   Resp 26   Temp 97.7 °F (36.5 °C)   Temp src    SpO2 99 %   O2 Device None (Room air)       Current Vitals:   Vital Signs  BP: (!) 165/78  Pulse: 71  Resp: 26  Temp: 97.7 °F (36.5 °C)  MAP (mmHg): (!) 101    Oxygen Therapy  SpO2: 99 %  O2 Device: None (Room air)        Physical Exam    Vital signs reviewed  General appearance: Patient is alert and in no acute distress saturating 100% on room air  HEENT: Pupils equal react to light extraocular muscles intact no scleral icterus, mucous membranes are moist, there is no erythema or exudate in the posterior pharynx  Neck: Supple no JVD no lymphadenopathy no meningismus no carotid bruit  CV: Regular rate and rhythm no murmur rub  Respiratory: Patient has some expiratory wheezes but does sound like it is coming from almost his throat area.  Lungs are clear no accessory muscle use   abdomen: Soft nontender nondistended, no rebound no guarding  no hepatosplenomegaly bowel sounds are present , no pulsatile mass  Extremities: No clubbing cyanosis 1+ pitting edema in lower extremities  Neuro: Cranial nerves II through XII intact with no gross focal sensory or motor abnormality.      ED Course   Labs Reviewed - No data to display  EKG    Rate, intervals and axes as noted on EKG Report.  Rate: 73  Rhythm: Sinus Rhythm  Reading: Right bundle branch block                Patient was evaluated the emergency department initially had CBC chemistry troponin BNP  and a chest x-ray ordered but the daughter states she absolutely does not want any test done just wants an inhaler or neb treatment.  I explained to her that his lungs do sound clear and it sounds more like an upper respiratory issue.  I told her I would like to least give some steroids.  She let me at least do that and ordered a breathing treatment but refusing any blood work or chest x-ray explained to her I could not see if anything structurally was going on without some films or some further workup.  She states she does not want it done I explained to her that if he has any significant respiratory distress this would be the only way I could find out anything serious going on she is declining and will sign out AGAINST MEDICAL ADVICE       MDM      Differential diagnosis reflecting the complexity of care include: Asthma, COPD, congestive heart failure    Comorbidities that add complexity to management include: Asthma, hypertension, right bundle branch block    Discussions of management was done with: Explained to the daughter and patient I would like to do further testing but they are declining      Shared decision making was done by myself patient and her daughter.  They are refusing any further testing.  Patient be discharged AGAINST MEDICAL ADVICE told to use inhalers at home.  Will write a prescription return if worse              [facility]    Disposition and Plan     Clinical Impression:  1. COPD exacerbation (HCC)    2. Shortness of breath         Disposition:  [facility]  11/7/2024  4:01 am    Follow-up:  Neftaly Herrera MD  [address] [place] [id number] [phone]    Follow up            Medications Prescribed:  Current Discharge Medication List        START taking these medications    Details   albuterol 108 (90 Base) MCG/ACT Inhalation Aero Soln Inhale 2 puffs into the lungs every 4 (four) hours as needed for Wheezing.  Qty: 1 each, Refills: 0                 Supplementary Documentation:

## 2024-11-07 NOTE — ED QUICK NOTES
Patient and patient family refusing tx at this time. Refuse cxr, refuse blood work. Daughter states if he had pneumonia or infection his blood oxygen would not be at 100%

## 2024-11-07 NOTE — ED QUICK NOTES
RN went to give patient AMA forms and discharge papers. Daughter now refusing to leave. When asked if they would like to receive treatment, patient and daughter stated they did not want anything further. Patient was given duoneb and solu-medrol, however daughter states they are not leaving with out talking to MD again. Dr. Ramirez went to bedside to talk to patient and family. RN was called back to remove IV and while in room, daughter stated they were not leaving, however continues to refuse care. Security alerted per MD     Security called and at bedside

## 2024-11-07 NOTE — ED QUICK NOTES
Security at bedside, daughter recording in room. Asked multiple times by security to cease recording and daughter continuing to escalate.

## 2025-07-22 ENCOUNTER — HOSPITAL ENCOUNTER (EMERGENCY)
Facility: HOSPITAL | Age: 85
Discharge: HOME | End: 2025-07-22
Attending: EMERGENCY MEDICINE
Payer: MEDICARE

## 2025-07-22 ENCOUNTER — APPOINTMENT (OUTPATIENT)
Dept: GENERAL RADIOLOGY | Facility: HOSPITAL | Age: 85
End: 2025-07-22
Attending: EMERGENCY MEDICINE
Payer: MEDICARE

## 2025-07-22 VITALS
RESPIRATION RATE: 22 BRPM | TEMPERATURE: 98 F | HEART RATE: 76 BPM | SYSTOLIC BLOOD PRESSURE: 134 MMHG | OXYGEN SATURATION: 95 % | BODY MASS INDEX: 22.73 KG/M2 | HEIGHT: 68 IN | DIASTOLIC BLOOD PRESSURE: 71 MMHG | WEIGHT: 150 LBS

## 2025-07-22 DIAGNOSIS — J18.9 COMMUNITY ACQUIRED PNEUMONIA OF LEFT LUNG, UNSPECIFIED PART OF LUNG: Primary | ICD-10-CM

## 2025-07-22 DIAGNOSIS — J98.01 ACUTE BRONCHOSPASM: ICD-10-CM

## 2025-07-22 PROCEDURE — 94664 DEMO&/EVAL PT USE INHALER: CPT

## 2025-07-22 PROCEDURE — 99284 EMERGENCY DEPT VISIT MOD MDM: CPT

## 2025-07-22 PROCEDURE — 71045 X-RAY EXAM CHEST 1 VIEW: CPT | Performed by: EMERGENCY MEDICINE

## 2025-07-22 PROCEDURE — 94644 CONT INHLJ TX 1ST HOUR: CPT

## 2025-07-22 RX ORDER — ALBUTEROL SULFATE 5 MG/ML
10 SOLUTION RESPIRATORY (INHALATION) ONCE
Status: COMPLETED | OUTPATIENT
Start: 2025-07-22 | End: 2025-07-22

## 2025-07-22 RX ORDER — AZITHROMYCIN 250 MG/1
TABLET, FILM COATED ORAL
Qty: 6 TABLET | Refills: 0 | Status: SHIPPED | OUTPATIENT
Start: 2025-07-22 | End: 2025-07-27

## 2025-07-22 RX ORDER — ALBUTEROL SULFATE 0.83 MG/ML
2.5 SOLUTION RESPIRATORY (INHALATION) EVERY 4 HOURS PRN
Qty: 30 EACH | Refills: 0 | Status: SHIPPED | OUTPATIENT
Start: 2025-07-22 | End: 2025-08-21

## 2025-07-22 RX ORDER — PREDNISONE 20 MG/1
60 TABLET ORAL ONCE
Status: COMPLETED | OUTPATIENT
Start: 2025-07-22 | End: 2025-07-22

## 2025-07-22 RX ORDER — PREDNISONE 20 MG/1
40 TABLET ORAL DAILY
Qty: 8 TABLET | Refills: 0 | Status: SHIPPED | OUTPATIENT
Start: 2025-07-23 | End: 2025-07-22

## 2025-07-22 RX ORDER — ALBUTEROL SULFATE 0.83 MG/ML
2.5 SOLUTION RESPIRATORY (INHALATION) EVERY 4 HOURS PRN
Qty: 30 EACH | Refills: 0 | Status: SHIPPED | OUTPATIENT
Start: 2025-07-22 | End: 2025-07-22

## 2025-07-22 RX ORDER — PREDNISONE 20 MG/1
40 TABLET ORAL DAILY
Qty: 8 TABLET | Refills: 0 | Status: SHIPPED | OUTPATIENT
Start: 2025-07-23 | End: 2025-07-27

## 2025-07-22 RX ORDER — ALBUTEROL SULFATE 90 UG/1
2 INHALANT RESPIRATORY (INHALATION) EVERY 6 HOURS PRN
COMMUNITY

## 2025-07-22 RX ORDER — AZITHROMYCIN 250 MG/1
TABLET, FILM COATED ORAL
Qty: 6 TABLET | Refills: 0 | Status: SHIPPED | OUTPATIENT
Start: 2025-07-22 | End: 2025-07-22

## 2025-07-22 NOTE — ED PROVIDER NOTES
Patient Seen in: [facility] Emergency Department        History  Chief Complaint   Patient presents with    Difficulty Breathing     Asthma attack,      Stated Complaint: asthma attack    Subjective:   84-year-old male, history of asthma, former smoker, presents with daughter with complaints of cough and congestion and wheezing, recently traveled back from [place] a couple days ago.  While he was there he had the symptoms.  Likely of chest pain or pleurisy.  He has no cardiac history he states.  His daughter is translating for him and Khmer at his request.   service offered and declined.  They do not want any blood work, done any swabs, did not want any EKGs or x-rays, he is on treatment for his asthma because he believes his nebulizer at home is not functioning very well.  States he feels like his normal asthma attack.  States he is \"healthy.\"                      Objective:     Past Medical History:    Asthma (HCC)    HEADACHES    High blood pressure    HYPERTENSION    Migraine without aura, without mention of intractable migraine without mention of status migrainosus    Migraines    RBBB              Past Surgical History:   Procedure Laterality Date    Cyst removal Left     face                Social History     Socioeconomic History    Marital status:     Number of children: 2   Occupational History    Occupation: school      Employer: [employer]   Tobacco Use    Smoking status: Former     Current packs/day: 0.00     Average packs/day: 1 pack/day for 30.0 years (30.0 ttl pk-yrs)     Types: Cigarettes     Start date: 10/1/1983     Quit date: 10/1/2013     Years since quittin.8    Smokeless tobacco: Never    Tobacco comments:     quit 4 months ago, smoked for 30 yrs.   Vaping Use    Vaping status: Never Used   Substance and Sexual Activity    Alcohol use: No    Drug use: No   Other Topics Concern    Caffeine Concern Yes     Comment: coffee                                 Physical Exam    ED Triage Vitals   BP 07/22/25 0749 147/88   Pulse 07/22/25 0746 99   Resp 07/22/25 0749 26   Temp 07/22/25 0749 97.8 °F (36.6 °C)   Temp src 07/22/25 0749 Temporal   SpO2 07/22/25 0746 97 %   O2 Device 07/22/25 0746 None (Room air)       Current Vitals:   Vital Signs  BP: 134/71  Pulse: 76  Resp: 22  Temp: 97.8 °F (36.6 °C)  Temp src: Temporal  MAP (mmHg): 89    Oxygen Therapy  SpO2: 95 %  O2 Device: None (Room air)            Physical Exam  Vitals and nursing note reviewed.   HENT:      Head: Normocephalic.   Cardiovascular:      Rate and Rhythm: Normal rate.      Heart sounds: Normal heart sounds.   Pulmonary:      Breath sounds: Examination of the right-upper field reveals wheezing. Examination of the left-upper field reveals wheezing. Examination of the right-middle field reveals wheezing. Examination of the left-middle field reveals wheezing. Examination of the right-lower field reveals decreased breath sounds. Examination of the left-lower field reveals decreased breath sounds. Decreased breath sounds and wheezing present.   Chest:      Chest wall: No tenderness or crepitus.   Musculoskeletal:         General: Normal range of motion.   Skin:     General: Skin is warm and dry.   Neurological:      General: No focal deficit present.      Mental Status: He is alert.   Psychiatric:         Behavior: Behavior normal.                 ED Course  Labs Reviewed   RAINBOW DRAW LAVENDER   RAINBOW DRAW LIGHT GREEN   RAINBOW DRAW BLUE   RAINBOW DRAW GOLD                            MDM     XR CHEST AP PORTABLE  (CPT=71045)  Result Date: 7/22/2025  CONCLUSION: Heart size within normal limits. Subsegmental left basilar atelectasis or scar without pulmonary edema or acute airspace disease. Thickening of central bronchi may reflect a component of bronchitis or reactive airway disease/asthma. No significant pleural effusion or appreciable pneumothorax. Electronically Verified and Signed  by Attending Radiologist: Severiano Navarrete MD 7/22/2025 9:50 AM Workstation: [id number]      I did independently interpreted x-ray of the chest, do note some haziness at the left deep sulci and he is rhonchorous there and concern for some pneumonia/infiltrate    Daughter at bedside helpful to provide information on the history of presenting illness    Differential diagnosis includes but not limited to viral syndrome, bacterial infection, PE    External chart review demonstrates outpatient telephone encounter with pharmacy and family practice over the last several months    83-year-old male with cough and wheezing.  Posterior pharynx is clear, complaining of mild sore throat but does not want a strep test.  Some wheezing, rhonchi at the left base.  X-ray some atelectasis there, we will treat him for pneumonia with his symptomology and his smoking history.  Did discuss PE and ACS and other causes including viral and bacterial illnesses, allergies etc.  They declined blood work.  Declined EKG.  Did not even want an x-ray initially but then were agreeable throughout his stay.  Better with nebulizer treatment.  Want a new nebulizer machine so we spoke with  and that was prescribed.  Will send him solution, prednisone, Zithromax.  They are agreeable.  Further workup offered stated discussed, discussed at length his benefits alternatives, shared decision made utilized and declined further workup.  Declines  service.    Patient was screened and evaluated during this visit.  As the treating physician attending to the patient, I determined within reasonable clinical confidence and prior to discharge, that an emergency medical condition was not or was no longer present.  There was no indication for further evaluation, treatment, or admission on an emergency basis.  Comprehensive verbal and written discharge and follow-up instructions were provided to help prevent relapse or worsening.  Patient was instructed  to follow-up with their primary care provider for further evaluation and treatment, return immediately to ER for worsening, concerning, new, or changing/persisting symptoms. I discussed the case with the patient and they had no questions, complaints, or concerns.  Patient was comfortable going home.     Per the discharge paperwork, patients are encouraged to and given instructions on how to sign up for MyChart, where they have access to their records, including any/all incidental findings.     This note was prepared using Dragon Medical voice recognition dictation software. As a result errors may occur. When identified these errors have been corrected. While every attempt is made to correct errors during dictation discrepancies may still exist    Note to patient: The 21st Century Cures Act makes medical notes like these available to patients in the interest of transparency. However, this is a medical document intended as peer to peer communication. It is written in medical language and may contain abbreviations or verbiage that are unfamiliar. It may appear blunt or direct. Medical documents are intended to carry relevant information, facts as evident, and the clinical opinion of the practitioner.       Medical Decision Making      Disposition and Plan     Clinical Impression:  1. Community acquired pneumonia of left lung, unspecified part of lung    2. Acute bronchospasm         Disposition:  Discharge  7/22/2025 10:04 am    Follow-up:  Neftaly Herrera MD  [address] [place] [address] [phone]    Follow up      [facility] Emergency Department  [address] [place] [address] [phone]  Follow up  As needed          Medications Prescribed:  Current Discharge Medication List        START taking these medications    Details   predniSONE 20 MG Oral Tab Take 2 tablets (40 mg total) by mouth daily for 4 days.  Qty: 8 tablet, Refills: 0      azithromycin (ZITHROMAX Z-BARB) 250 MG Oral  Tab 500 mg once followed by 250 mg daily x 4 days  Qty: 6 tablet, Refills: 0      albuterol (2.5 MG/3ML) 0.083% Inhalation Nebu Soln Take 3 mL (2.5 mg total) by nebulization every 4 (four) hours as needed for Wheezing or Shortness of Breath.  Qty: 30 each, Refills: 0                   Supplementary Documentation:

## 2025-07-22 NOTE — ED QUICK NOTES
Pt speaks brenna, daughter interpreting.  service offered and pt and daughter declined at this time.

## 2025-07-22 NOTE — CM/SW NOTE
Emergency Department Discharge Plan  Patient was given a nebulizer machine from the ED consignment closet for use post discharge. Written instructions on use were provided. Contact information for the vendor [employer] is also included.    Discussed cleaning the equipment.  Written cleaning instructions also provided.  Daughter verbalized understanding instructions.

## 2025-07-22 NOTE — ED QUICK NOTES
T reevaluated by dr. Hurtado. Pt and daughter informed of pt's plan of care. Verbalizing understanding

## (undated) DEVICE — MEGADYNE E-Z CLEAN BLADE 2.75"

## (undated) DEVICE — SPONGE RAYTEC 4X4 RF DETECT

## (undated) DEVICE — RETRACT LONE STAR STAYS DULL

## (undated) DEVICE — STANDARD HYPODERMIC NEEDLE,POLYPROPYLENE HUB: Brand: MONOJECT

## (undated) DEVICE — HEMOSTAT ARISTA 1GRAM

## (undated) DEVICE — SOL NACL IRRIG 0.9% 1000ML BTL

## (undated) DEVICE — ELECTRODE 8227410 PAIRED 2 CH SET ROHS

## (undated) DEVICE — CABLE BIPOLAR 12FT DISPOSABLE

## (undated) DEVICE — PROBE 8225101 5PK STD PRASS FL TIP ROHS

## (undated) DEVICE — 3M™ TEGADERM™ TRANSPARENT FILM DRESSING FRAME STYLE, 1624W, US-VER, 100/CARTON 4 CARTONS/CASE: Brand: 3M™ TEGADERM™

## (undated) DEVICE — SPONGE: SPECIALTY PEANUT XR 100/CS: Brand: MEDICAL ACTION INDUSTRIES

## (undated) DEVICE — DRAPE SURG 18X24

## (undated) DEVICE — DERMABOND CLOSURE 0.7ML TOPICL

## (undated) DEVICE — LIGHT HANDLE

## (undated) DEVICE — HARMONIC FOCUS SHEARS 9CM LENGTH + ADAPTIVE TISSUE TECHNOLOGY FOR USE WITH BLUE HAND PIECE ONLY: Brand: HARMONIC FOCUS

## (undated) DEVICE — YANKAUER,FLEXIBLE HANDLE,FINE CAPACITY: Brand: MEDLINE

## (undated) DEVICE — PREMIUM WET SKIN PREP TRAY: Brand: MEDLINE INDUSTRIES, INC.

## (undated) DEVICE — UNDYED BRAIDED (POLYGLACTIN 910), SYNTHETIC ABSORBABLE SUTURE: Brand: COATED VICRYL

## (undated) DEVICE — SUT PLAIN GUT 5-0 PC-1 1915G

## (undated) DEVICE — HEAD AND NECK CDS-LF: Brand: MEDLINE INDUSTRIES, INC.

## (undated) DEVICE — STERILE SYNTHETIC POLYISOPRENE POWDER-FREE SURGICAL GLOVES WITH HYDROGEL COATING, SMOOTH FINISH, STRAIGHT FINGER: Brand: PROTEXIS

## (undated) DEVICE — SUT VICRYL 3-0 SH J416H

## (undated) DEVICE — SLEEVE KENDALL SCD EXPRESS MED

## (undated) NOTE — LETTER
1/26/2018              Fernando España        [id number]        [facility] [address]         Dear Brice Friend,    This letter is to inform you that our office has made several attempts to reach you by phone without success.   We were attempting to cont

## (undated) NOTE — LETTER
Date & Time: 11/7/2024, 4:03 AM  Patient: Caitlin Claudio  Encounter Provider(s):    Spencer Ramirez MD         This certifies that I, Caitlin Claudio, a patient at an [facility], am leaving the facility voluntarily and against the advice of my physician.    I acknowledge that I have been:    1. informed that my physician believes that I need to receive care here;  2. informed that if I leave, I could become sicker or even die; and  3. provided discharge instructions consistent with my current diagnosis.    I hereby release my physician, the facility, and its employees from all responsibility for any ill effects which may result from this action.        __________________________________  Patient or authorized caregiver signature    __________________________________  RN signature    If no patient or patient representative signature was obtained, sign below to acknowledge that the form was reviewed with the patient and that the patient refused to sign.    __________________________________  RN signature

## (undated) NOTE — LETTER
03/25/20        Shahla Aguillon  [address] [address]      Dear [facility] records indicate that you have outstanding lab work and or testing that was ordered for you and has not yet been completed:  Orders Placed This Encounter

## (undated) NOTE — MR AVS SNAPSHOT
[facility]  [address] [facility] [address] [place], [address] [id number]               Thank you for choosing us for your health care visit with Cordelia Arellano MD.  We are glad to serve you and happy to p Annual physical exam    -  Primary    Mediastinal mass          Instructions and Information about Your Health     None      Allergies as of Jun 08, 2017     No Known Allergies                Today's Vital Signs     BP Pulse Temp Height Weight BMI    112/ view more details from this visit by going to [email or web address]. If you've recently had a stay at the Hospital you can access your discharge instructions in [employer] by going to Visits < Admission Summaries.  If you've been to the Emergency Depar priority on exercise in your life                    Visit [facility] online at  [email or web address]